# Patient Record
Sex: MALE | Race: BLACK OR AFRICAN AMERICAN | NOT HISPANIC OR LATINO | Employment: UNEMPLOYED | URBAN - METROPOLITAN AREA
[De-identification: names, ages, dates, MRNs, and addresses within clinical notes are randomized per-mention and may not be internally consistent; named-entity substitution may affect disease eponyms.]

---

## 2017-07-31 ENCOUNTER — TELEPHONE (OUTPATIENT)
Dept: PEDIATRICS | Facility: CLINIC | Age: 22
End: 2017-07-31

## 2017-07-31 NOTE — TELEPHONE ENCOUNTER
Spoke with patient's mom. Told her that I received a fax from [employer] to have a 90 L form completed. Told mom I will have to schedule him an appointment b/c he hasn't been seen in over a year. Scheduled an appointment for Monday 8/14/17 at 4:20. Mom verbalized understanding and asked that I mail a reminder. Verified address and told her I will put in mail.

## 2017-09-11 ENCOUNTER — TELEPHONE (OUTPATIENT)
Dept: PEDIATRICS | Facility: CLINIC | Age: 22
End: 2017-09-11

## 2017-09-11 NOTE — TELEPHONE ENCOUNTER
Spoke with patient's mom. She wanted to reschedule his appointment. Scheduled it for tomorrow 9/12/17 at 9 am.

## 2017-09-11 NOTE — TELEPHONE ENCOUNTER
----- Message from Erin Herrera sent at 9/11/2017  9:42 AM CDT -----  Contact: Mrs Kelly/mother  States she's returning Latisha's call. Please call Mrs Kelly at [phone]. Thank you

## 2017-09-11 NOTE — TELEPHONE ENCOUNTER
----- Message from Amrit Rajput sent at 9/11/2017  8:48 AM CDT -----  Contact: [phone]   Would like to consult with nurse about fit in for pt on 9/12 or 9/13 around 8:30 on either day. 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  would like to bring him then. Please call back at [phone]. Thx. lj

## 2017-09-12 ENCOUNTER — OFFICE VISIT (OUTPATIENT)
Dept: PEDIATRICS | Facility: CLINIC | Age: 22
End: 2017-09-12
Payer: COMMERCIAL

## 2017-09-12 VITALS
HEIGHT: 68 IN | WEIGHT: 166.25 LBS | DIASTOLIC BLOOD PRESSURE: 72 MMHG | SYSTOLIC BLOOD PRESSURE: 118 MMHG | TEMPERATURE: 96 F | BODY MASS INDEX: 25.2 KG/M2

## 2017-09-12 DIAGNOSIS — G47.33 OBSTRUCTIVE SLEEP APNEA: ICD-10-CM

## 2017-09-12 DIAGNOSIS — F84.0 AUTISM: ICD-10-CM

## 2017-09-12 DIAGNOSIS — Z00.00 WELL ADULT EXAM: Primary | ICD-10-CM

## 2017-09-12 PROCEDURE — 99395 PREV VISIT EST AGE 18-39: CPT | Mod: S$GLB,,, | Performed by: PEDIATRICS

## 2017-09-12 PROCEDURE — 99999 PR PBB SHADOW E&M-EST. PATIENT-LVL III: CPT | Mod: PBBFAC,,, | Performed by: PEDIATRICS

## 2017-09-18 ENCOUNTER — TELEPHONE (OUTPATIENT)
Dept: PEDIATRICS | Facility: CLINIC | Age: 22
End: 2017-09-18

## 2017-09-18 NOTE — TELEPHONE ENCOUNTER
Called and spoke with mom. Mom verbalized she misplaced the copy of the 90L form. Mom verbalized she will pick it up. Forms placed at the . Mom verbalized understanding.

## 2017-09-18 NOTE — TELEPHONE ENCOUNTER
----- Message from Yvan Roca sent at 9/18/2017  2:10 PM CDT -----  Contact: pt Mother mEely  Pt is calling Nurse staff call regarding a copy of pt 90L form. Pt has misplaced the other copy. Pt call back [phone] to be advised. thanks

## 2017-09-20 ENCOUNTER — TELEPHONE (OUTPATIENT)
Dept: PEDIATRICS | Facility: CLINIC | Age: 22
End: 2017-09-20

## 2017-09-20 DIAGNOSIS — J01.90 ACUTE BACTERIAL SINUSITIS: Primary | ICD-10-CM

## 2017-09-20 DIAGNOSIS — B96.89 ACUTE BACTERIAL SINUSITIS: Primary | ICD-10-CM

## 2017-09-20 NOTE — TELEPHONE ENCOUNTER
Spoke with patient's mom. She said that Rome has a bad cough and would like to know if anything can be given? He was recently seen for his sinus issues.

## 2017-09-20 NOTE — TELEPHONE ENCOUNTER
----- Message from Lola Wilson sent at 9/20/2017  1:56 PM CDT -----  Contact: pt mom Mrs. Kelly  States patient is still having trouble with his sinus. Wants to know if something can be called in.    Pt use..  [facility] [place] - [address] [facility]  [address]  [place] [address]  Phone: [phone] Fax: [phone]    Please adv/call [phone].//thanks. cw

## 2017-09-21 RX ORDER — CEFDINIR 300 MG/1
300 CAPSULE ORAL 2 TIMES DAILY
Qty: 20 CAPSULE | Refills: 0 | Status: SHIPPED | OUTPATIENT
Start: 2017-09-21 | End: 2017-10-01

## 2017-09-21 NOTE — TELEPHONE ENCOUNTER
----- Message from Marta Pandya sent at 9/21/2017  3:11 PM CDT -----  Contact: mom   Mom returning nurse Latisha call, please call mom @ [phone].

## 2017-10-01 NOTE — PROGRESS NOTES
Subjective:      Rome Kelly is a 21 y.o. male here with mother. Patient brought in for Well Child (90L)      History of Present Illness:  This 21 year old with autism and FREDERICK is here for a check up.  He is in self contained autism class at [facility].  He has no current complaints.  No medications, no recent hospitalizations. He continues to use his CPAP at night.        Review of Systems   Constitutional: Negative for fever and unexpected weight change.   HENT: Positive for congestion and rhinorrhea.    Eyes: Negative for discharge and redness.   Respiratory: Positive for cough. Negative for wheezing.    Gastrointestinal: Negative for constipation, diarrhea and vomiting.   Genitourinary: Negative for decreased urine volume and difficulty urinating.   Musculoskeletal: Negative for arthralgias and joint swelling.   Skin: Negative for rash and wound.   Neurological: Negative for syncope and headaches.   Psychiatric/Behavioral: Negative for behavioral problems and sleep disturbance.       Objective:     Physical Exam   Constitutional: He is oriented to person, place, and time. He appears well-developed and well-nourished. No distress.   HENT:   Right Ear: External ear normal.   Left Ear: External ear normal.   Mouth/Throat: Oropharynx is clear and moist.   TMs clear bilaterally   Eyes: Conjunctivae are normal. Pupils are equal, round, and reactive to light.   Cardiovascular: Normal rate, regular rhythm and normal heart sounds.    No murmur heard.  Pulmonary/Chest: Effort normal and breath sounds normal.   Abdominal: Soft. Bowel sounds are normal. He exhibits no mass. There is no tenderness.   Musculoskeletal: He exhibits no edema.   Lymphadenopathy:     He has no cervical adenopathy.   Neurological: He is alert and oriented to person, place, and time.   Skin: Skin is warm. No rash noted.   Psychiatric: He has a normal mood and affect. His behavior is normal.       Assessment:        1. Well adult exam    2.  Autism    3. Obstructive sleep apnea         Plan:         Problem List Items Addressed This Visit     Autism    Obstructive sleep apnea      Other Visit Diagnoses     Well adult exam    -  Primary        90L form completed (will be scanned)  Age appropriate anticipatory guidance  All vaccine components discussed  Call with any concerns

## 2017-10-13 ENCOUNTER — TELEPHONE (OUTPATIENT)
Dept: PEDIATRICS | Facility: CLINIC | Age: 22
End: 2017-10-13

## 2017-10-13 NOTE — TELEPHONE ENCOUNTER
LMOM requesting return call. Pt needs to see dr who originally gave him rx for CPAP. Dr Ramos does not order CPAP.

## 2017-10-13 NOTE — TELEPHONE ENCOUNTER
----- Message from Nathalie Aiken sent at 10/12/2017  4:26 PM CDT -----  Contact: Jihan Kelly (Mother)  Jihan called and stated she needed to speak to the nurse. She stated that the pt needs a CPAP machine. She can be reached at [phone].    Thanks,  TF

## 2017-11-07 ENCOUNTER — TELEPHONE (OUTPATIENT)
Dept: PEDIATRICS | Facility: CLINIC | Age: 22
End: 2017-11-07

## 2017-11-07 NOTE — TELEPHONE ENCOUNTER
----- Message from Marta Pandya sent at 11/6/2017 10:59 AM CST -----  Contact: mom  Please call pt mom @ [phone], regarding an order for pt to get lab tomorrow.

## 2017-11-14 ENCOUNTER — TELEPHONE (OUTPATIENT)
Dept: PEDIATRICS | Facility: CLINIC | Age: 22
End: 2017-11-14

## 2017-11-14 NOTE — TELEPHONE ENCOUNTER
----- Message from Ania Hinton sent at 11/13/2017  4:55 PM CST -----  Contact: Mrs. Kelly/mom  Mrs. Kelly called to speak with the nurse; she wants to know if there is any type of test that can be done to check on him mentally. Because when she gives instructions he does the opposite. She can be contacted at [phone].    Thanks,  Aina

## 2017-11-15 ENCOUNTER — TELEPHONE (OUTPATIENT)
Dept: PEDIATRICS | Facility: CLINIC | Age: 22
End: 2017-11-15

## 2017-11-15 NOTE — TELEPHONE ENCOUNTER
----- Message from Lola Wilson sent at 11/15/2017  3:20 PM CST -----  Contact: Pasquale Giordano returning Jennie call. Please adv/call [phone].//thanks. cw

## 2017-11-15 NOTE — TELEPHONE ENCOUNTER
----- Message from Adelina [place] sent at 11/15/2017  9:20 AM CST -----  Contact: mother Emely  Calling regarding information about patient's surgery. Mother is giving permission to father to be called regarding patient. Please call father Pasquale today ASAP @ [phone]. Thanks, mann

## 2017-11-15 NOTE — TELEPHONE ENCOUNTER
Father wanted to know if we received fax from Dr Herrera, dentist. Pt is nadir'd for dental surgery on Dec 1. Informed him I would look and see if we received fax and call him back. Fax listing lab and asking if pt had recently received any of the lab test. Replied informing them he had not. Called father, dilip.

## 2017-11-16 ENCOUNTER — TELEPHONE (OUTPATIENT)
Dept: PEDIATRICS | Facility: CLINIC | Age: 22
End: 2017-11-16

## 2017-11-16 DIAGNOSIS — F84.0 AUTISM: Primary | ICD-10-CM

## 2017-11-16 DIAGNOSIS — R46.89 DEFIANT BEHAVIOR: ICD-10-CM

## 2017-11-16 NOTE — TELEPHONE ENCOUNTER
I, also, have not seen the form.      Let mother know that the patient's behavior is typical of a teenager, especially a teenager with autism.  Let me know if she thinks it's time for a repeat psychological evaluation.

## 2017-11-16 NOTE — TELEPHONE ENCOUNTER
----- Message from Trisha Schreiber LPN sent at 11/16/2017 10:33 AM CST -----  Mother informed of Dr Ramos's reply regarding his behavior. Mother states she would like for Rome to start see psychologist, Chad Almonte, that already sees Carlo. He works at [facility].  Informed her I would let Dr Ramos know so she can put in referral.

## 2017-11-17 ENCOUNTER — TELEPHONE (OUTPATIENT)
Dept: INTERNAL MEDICINE | Facility: CLINIC | Age: 22
End: 2017-11-17

## 2017-11-17 NOTE — TELEPHONE ENCOUNTER
Psychology referral faxed w/ [employer]graphics and snapshot to [facility] P#[phone] F#[phone], w/ instructions for their office to call pt's parent(s) and sched pt for appt w/ appropriate provider, fax transmission confirmed.

## 2017-11-27 ENCOUNTER — TELEPHONE (OUTPATIENT)
Dept: PEDIATRICS | Facility: CLINIC | Age: 22
End: 2017-11-27

## 2017-11-27 NOTE — TELEPHONE ENCOUNTER
Spoke with Marimar with Dr. Herrera. She stated that they were not able to read fax. Requested that it be faxed again.

## 2018-01-04 ENCOUNTER — TELEPHONE (OUTPATIENT)
Dept: PEDIATRICS | Facility: CLINIC | Age: 23
End: 2018-01-04

## 2018-01-04 NOTE — TELEPHONE ENCOUNTER
----- Message from Maria E Cummings sent at 1/4/2018  3:23 PM CST -----  Contact: Pt-mom    Pt mom wanted to know when was the last time pt was checked for chloesterol and diabetes, mom also wanted to know if pt can be seen Monday callback number..[phone] (home)

## 2018-01-05 NOTE — TELEPHONE ENCOUNTER
----- Message from Pat Portillo sent at 1/5/2018 12:49 PM CST -----  Pt mom(Lisas)// At [phone]//states is calling regarding an appt//Dr Ramos wants pt to have a checkup every 2 yrs//to be checked for diabetics and cholesterol/// it is time to have done now//his brother has an appt on Monday//1-8-18 at 10:40am//with Dr Ramos//is wanting to know if possible to have appt for pt also//please call//thanks/lh

## 2018-01-08 ENCOUNTER — OFFICE VISIT (OUTPATIENT)
Dept: PEDIATRICS | Facility: CLINIC | Age: 23
End: 2018-01-08
Payer: COMMERCIAL

## 2018-01-08 ENCOUNTER — LAB VISIT (OUTPATIENT)
Dept: LAB | Facility: HOSPITAL | Age: 23
End: 2018-01-08
Attending: PEDIATRICS
Payer: COMMERCIAL

## 2018-01-08 VITALS
WEIGHT: 163.13 LBS | BODY MASS INDEX: 24.16 KG/M2 | TEMPERATURE: 96 F | HEIGHT: 69 IN | DIASTOLIC BLOOD PRESSURE: 76 MMHG | SYSTOLIC BLOOD PRESSURE: 116 MMHG

## 2018-01-08 DIAGNOSIS — Z00.00 WELL ADULT EXAM: Primary | ICD-10-CM

## 2018-01-08 DIAGNOSIS — G47.33 OBSTRUCTIVE SLEEP APNEA: ICD-10-CM

## 2018-01-08 DIAGNOSIS — Z83.3 FAMILY HISTORY OF DIABETES MELLITUS (DM): ICD-10-CM

## 2018-01-08 DIAGNOSIS — F84.0 AUTISM: ICD-10-CM

## 2018-01-08 DIAGNOSIS — Z00.00 WELL ADULT EXAM: ICD-10-CM

## 2018-01-08 LAB
CHOLEST SERPL-MCNC: 193 MG/DL
CHOLEST/HDLC SERPL: 3.1 {RATIO}
ESTIMATED AVG GLUCOSE: 108 MG/DL
HBA1C MFR BLD HPLC: 5.4 %
HDLC SERPL-MCNC: 63 MG/DL
HDLC SERPL: 32.6 %
HGB BLD-MCNC: 14.5 G/DL
LDLC SERPL CALC-MCNC: 120 MG/DL
NONHDLC SERPL-MCNC: 130 MG/DL
TRIGL SERPL-MCNC: 50 MG/DL
TSH SERPL DL<=0.005 MIU/L-ACNC: 1.44 UIU/ML

## 2018-01-08 PROCEDURE — 90732 PPSV23 VACC 2 YRS+ SUBQ/IM: CPT | Mod: S$GLB,,, | Performed by: PEDIATRICS

## 2018-01-08 PROCEDURE — 99395 PREV VISIT EST AGE 18-39: CPT | Mod: 25,S$GLB,, | Performed by: PEDIATRICS

## 2018-01-08 PROCEDURE — 84443 ASSAY THYROID STIM HORMONE: CPT

## 2018-01-08 PROCEDURE — 99999 PR PBB SHADOW E&M-EST. PATIENT-LVL III: CPT | Mod: PBBFAC,,, | Performed by: PEDIATRICS

## 2018-01-08 PROCEDURE — 80061 LIPID PANEL: CPT

## 2018-01-08 PROCEDURE — 83036 HEMOGLOBIN GLYCOSYLATED A1C: CPT

## 2018-01-08 PROCEDURE — 90471 IMMUNIZATION ADMIN: CPT | Mod: S$GLB,,, | Performed by: PEDIATRICS

## 2018-01-08 PROCEDURE — 36415 COLL VENOUS BLD VENIPUNCTURE: CPT | Mod: PO

## 2018-01-08 PROCEDURE — 85018 HEMOGLOBIN: CPT

## 2018-01-10 ENCOUNTER — TELEPHONE (OUTPATIENT)
Dept: PEDIATRICS | Facility: CLINIC | Age: 23
End: 2018-01-10

## 2018-01-10 NOTE — TELEPHONE ENCOUNTER
----- Message from Nathaly Lalo sent at 1/10/2018  4:35 PM CST -----  Contact: Pt's Mom  She states that the pt is running a slight fever, but she is concerned about his sinuses and wanted to know if antibiotics can be called in for the pt.    She can be reached at .[phone] (home) [phone] (work)    .  [employer] [address] [place] [address] [address] AT [facility] [place] [facility]\"" [place] [address]  [facility] [place] [address]  Phone: [phone] Fax: [phone]

## 2018-01-10 NOTE — TELEPHONE ENCOUNTER
Mother states he feels hot on his arms, ask her did she check temperature. Mother states thermometer is broken. Advised her that she can give him tylenol or motrin but she will need to replace the thermometer. Explained that she can't accurately  a fever by feeling.

## 2018-01-11 ENCOUNTER — TELEPHONE (OUTPATIENT)
Dept: PEDIATRICS | Facility: CLINIC | Age: 23
End: 2018-01-11

## 2018-01-11 NOTE — TELEPHONE ENCOUNTER
----- Message from Lynda Wilson sent at 1/11/2018  2:13 PM CST -----  Contact: mother  States the number to [facility] is [phone] / [phone], no additional info given, can be reached at [phone]///thxMW

## 2018-01-11 NOTE — LETTER
January 11, 2018    Rome Kelly   [address] [address]  [place] [address]             [facility]  [address] [place] [address]  Phone: [phone]  Fax: [phone] Dear Mr. Rome Kelly:    Below are the results from your recent visit on 1/8/2018      Your results are normal.  Please don't hesitate to call our office if you have any questions or concerns.      Sincerely,        Mary Ramos MD/Kalani Donaldson LPN

## 2018-01-11 NOTE — TELEPHONE ENCOUNTER
Mother was calling to give fax number to have 90L form to be faxed. Informed that 90L was already faxed on 1/8/18. Mother verbalized understanding.     Mother would also like a letter stating when pt had labs done and that labs were normal. She would like it mailed.

## 2018-01-22 NOTE — PROGRESS NOTES
Subjective:      Rome Kelly is a 22 y.o. male here with patient and mother. Patient brought in for Well Child      History of Present Illness:  This 22-year-old is here for a well-child exam.  He has autism, obstructive sleep apnea, history of diabetes.  He states that he is doing well.  His mother has no specifics complaints or concerns.  He needs his 90L form filled out.        Review of Systems   Constitutional: Negative for fever and unexpected weight change.   HENT: Negative for congestion and rhinorrhea.    Eyes: Negative for discharge and redness.   Respiratory: Negative for cough and wheezing.    Gastrointestinal: Negative for constipation, diarrhea and vomiting.   Genitourinary: Negative for decreased urine volume and difficulty urinating.   Musculoskeletal: Negative for arthralgias and joint swelling.   Skin: Negative for rash and wound.   Neurological: Negative for syncope and headaches.   Psychiatric/Behavioral: Negative for behavioral problems and sleep disturbance.       Objective:     Physical Exam   Constitutional: He appears well-developed and well-nourished. No distress.   HENT:   Head: Normocephalic and atraumatic.   Right Ear: Tympanic membrane and external ear normal.   Left Ear: Tympanic membrane and external ear normal.   Nose: Nose normal.   Mouth/Throat: Uvula is midline, oropharynx is clear and moist and mucous membranes are normal. Normal dentition.   Eyes: Conjunctivae, EOM and lids are normal. Pupils are equal, round, and reactive to light.   Neck: Trachea normal and normal range of motion. Neck supple. No thyromegaly present.   Cardiovascular: Normal rate, regular rhythm, S1 normal, S2 normal, normal heart sounds and normal pulses.  Exam reveals no gallop and no friction rub.    No murmur heard.  Pulmonary/Chest: Effort normal and breath sounds normal. He has no wheezes. He has no rales.   Abdominal: Soft. Normal appearance and bowel sounds are normal. He exhibits no mass.  There is no hepatosplenomegaly. There is no tenderness. There is no rebound and no guarding.   Musculoskeletal: Normal range of motion.   No scoliosis.   Lymphadenopathy:     He has no cervical adenopathy.   Neurological: He is alert. He has normal strength. Coordination and gait normal.   Skin: Skin is warm and intact. No rash noted.   Psychiatric: He has a normal mood and affect. His speech is normal and behavior is normal.       Assessment:        1. Well adult exam    2. Obstructive sleep apnea    3. Autism    4. Family history of diabetes mellitus (DM)         Plan:         Problem List Items Addressed This Visit     Autism    Obstructive sleep apnea      Other Visit Diagnoses     Well adult exam    -  Primary    Relevant Orders    TSH (Completed)    Hemoglobin (Completed)    (In Office Administered) Pneumococcal Polysaccharide Vaccine (23 Valent) (SQ/IM) (Completed)    Family history of diabetes mellitus (DM)        Relevant Orders    Hemoglobin A1c (Completed)    Lipid panel (Completed)      90L completed and faxed  Age appropriate anticipatory guidance  All vaccine components discussed  Call with any concerns

## 2018-02-09 ENCOUNTER — TELEPHONE (OUTPATIENT)
Dept: PEDIATRICS | Facility: CLINIC | Age: 23
End: 2018-02-09

## 2018-02-09 NOTE — TELEPHONE ENCOUNTER
----- Message from Staci Joseph sent at 2/9/2018  1:24 PM CST -----  Contact: Rashaun-[employer]   He's returning a call, please advise [phone] or [phone] ext [phone]

## 2018-02-09 NOTE — TELEPHONE ENCOUNTER
S/w Rashaun, states he wanted to let you know that he actually doesn't have anything to do with getting a c pap machine for the pt. States that is between the family, dr, and ins. Informed him I will let Dr Ramos know.

## 2018-04-11 ENCOUNTER — TELEPHONE (OUTPATIENT)
Dept: PEDIATRICS | Facility: CLINIC | Age: 23
End: 2018-04-11

## 2018-04-11 NOTE — TELEPHONE ENCOUNTER
----- Message from Carolina Skelton sent at 4/11/2018  8:24 AM CDT -----  Contact: Patients mother, Ursula Burgos needs a statement that her son is Autistic, and patient has been a patient since Cint[facility] in 2005, letter needs to state that he is autistic, please call her back at [phone]. thank you

## 2018-05-01 ENCOUNTER — TELEPHONE (OUTPATIENT)
Dept: PEDIATRICS | Facility: CLINIC | Age: 23
End: 2018-05-01

## 2018-05-01 NOTE — TELEPHONE ENCOUNTER
Mother wants to know when is the earliest her sons can be checked for prostate cancer. A friend of her's passed yesterday from it and it has made her worry and she wants to keep on top of things. Informed her I will have to ask Dr Ramos and call her back.

## 2018-05-01 NOTE — TELEPHONE ENCOUNTER
I don't do that kind of exam.  It is a digital rectal exam.  A urologist can do that. however, generally this is not done tell people are at least 50 years old.

## 2018-05-01 NOTE — TELEPHONE ENCOUNTER
----- Message from Eveline Cuevas sent at 5/1/2018 12:15 PM CDT -----  Contact: pt mom  Calling with questions  And please advise [phone] (home) [phone] (work)

## 2018-05-21 ENCOUNTER — TELEPHONE (OUTPATIENT)
Dept: PEDIATRICS | Facility: CLINIC | Age: 23
End: 2018-05-21

## 2018-05-21 NOTE — TELEPHONE ENCOUNTER
Mother states she and her mother have appts on 05/25/18 at 10am, requesting sons to be seen either before or after her appt. Tang'd for 11:20 am, advised mother to come down after her appt and check them in. Mother agrees

## 2018-05-21 NOTE — TELEPHONE ENCOUNTER
----- Message from Lynda Wilson sent at 5/21/2018 10:00 AM CDT -----  Contact: Mother  States the pt has a cough and wants to be worked in on 5/25,  can be reached at [phone]///thxMW

## 2018-05-25 ENCOUNTER — OFFICE VISIT (OUTPATIENT)
Dept: PEDIATRICS | Facility: CLINIC | Age: 23
End: 2018-05-25
Payer: COMMERCIAL

## 2018-05-25 VITALS — BODY MASS INDEX: 26.2 KG/M2 | TEMPERATURE: 97 F | WEIGHT: 174.81 LBS

## 2018-05-25 DIAGNOSIS — J20.8 ACUTE BACTERIAL BRONCHITIS: Primary | ICD-10-CM

## 2018-05-25 DIAGNOSIS — B96.89 ACUTE BACTERIAL BRONCHITIS: Primary | ICD-10-CM

## 2018-05-25 PROCEDURE — 3008F BODY MASS INDEX DOCD: CPT | Mod: CPTII,S$GLB,, | Performed by: PEDIATRICS

## 2018-05-25 PROCEDURE — 99213 OFFICE O/P EST LOW 20 MIN: CPT | Mod: S$GLB,,, | Performed by: PEDIATRICS

## 2018-05-25 PROCEDURE — 99999 PR PBB SHADOW E&M-EST. PATIENT-LVL II: CPT | Mod: PBBFAC,,, | Performed by: PEDIATRICS

## 2018-05-25 RX ORDER — AZITHROMYCIN 250 MG/1
TABLET, FILM COATED ORAL
Qty: 6 TABLET | Refills: 0 | Status: SHIPPED | OUTPATIENT
Start: 2018-05-25 | End: 2018-05-30

## 2018-06-04 NOTE — PROGRESS NOTES
Subjective:      Rome Kelly is a 22 y.o. male here with patient and mother. Patient brought in for Cough and Nasal Congestion      History of Present Illness:  Cough   This is a new problem. The current episode started 1 to 4 weeks ago. The problem has been gradually worsening. The problem occurs hourly. The cough is productive of sputum. Associated symptoms include nasal congestion and rhinorrhea. Pertinent negatives include no fever, headaches, rash, shortness of breath or wheezing. The symptoms are aggravated by lying down. He has tried nothing for the symptoms.       Review of Systems   Constitutional: Negative for activity change, appetite change and fever.   HENT: Positive for congestion and rhinorrhea.    Eyes: Negative for discharge.   Respiratory: Positive for cough. Negative for shortness of breath and wheezing.    Gastrointestinal: Negative for diarrhea and vomiting.   Genitourinary: Negative for decreased urine volume.   Skin: Negative for rash.   Neurological: Negative for headaches.       Objective:     Physical Exam   Constitutional: He is oriented to person, place, and time. He appears well-developed and well-nourished. No distress.   HENT:   Right Ear: External ear normal.   Left Ear: External ear normal.   Mouth/Throat: Oropharynx is clear and moist.   TMs clear bilaterally.   Eyes: Conjunctivae are normal. Pupils are equal, round, and reactive to light.   Cardiovascular: Normal rate, regular rhythm and normal heart sounds.    No murmur heard.  Pulmonary/Chest: Effort normal. He has rales (loose, bilateral, cleared with cough).   Abdominal: Soft. Bowel sounds are normal. He exhibits no mass. There is no tenderness.   Musculoskeletal: He exhibits no edema.   Lymphadenopathy:     He has no cervical adenopathy.   Neurological: He is alert and oriented to person, place, and time.   Skin: Skin is warm. No rash noted.   Psychiatric: He has a normal mood and affect. His behavior is normal.        Assessment:        1. Acute bacterial bronchitis         Plan:         Problem List Items Addressed This Visit     None      Visit Diagnoses     Acute bacterial bronchitis    -  Primary        Azithromycin x 5 days  Symptomatic measures  Call with any new or worsening problems  Follow up as needed

## 2018-08-30 ENCOUNTER — TELEPHONE (OUTPATIENT)
Dept: PEDIATRICS | Facility: CLINIC | Age: 23
End: 2018-08-30

## 2018-08-30 NOTE — TELEPHONE ENCOUNTER
----- Message from Pauline Gonzalez sent at 8/30/2018  8:50 AM CDT -----  Contact: mother  FYI; 90L form will be sent to dr. Ramos..[phone]

## 2018-08-31 NOTE — TELEPHONE ENCOUNTER
Completed form faxed back to [phone]. Notified mother that completed 90L was faxed. Mother verbalized understanding and requested that a copy of pt and siblings 90L be mailed to her PO box. Pt's 90L mailed, waiting for siblings to be scanned into the computer.

## 2018-09-07 ENCOUNTER — TELEPHONE (OUTPATIENT)
Dept: PEDIATRICS | Facility: CLINIC | Age: 23
End: 2018-09-07

## 2018-09-07 NOTE — TELEPHONE ENCOUNTER
Spoke with patient's mom. She was calling about her other child. I will leave message under the correct child.

## 2018-09-07 NOTE — TELEPHONE ENCOUNTER
----- Message from Adelina [place] sent at 9/7/2018  4:25 PM CDT -----  Contact: mother Mrs Kelly  Calling concerning a letter sent on patient. Please call mother @ [phone]. Thanks, mann

## 2018-10-25 ENCOUNTER — TELEPHONE (OUTPATIENT)
Dept: PEDIATRICS | Facility: CLINIC | Age: 23
End: 2018-10-25

## 2018-10-25 NOTE — TELEPHONE ENCOUNTER
----- Message from Lynda Wilson sent at 10/25/2018 11:02 AM CDT -----  Contact: Mother  States a letter is need for voters registration stating the pt is autistic and  is a pt of Dr. Ramos, the pt mother can be reached at [phone]///thxMW

## 2018-11-16 ENCOUNTER — TELEPHONE (OUTPATIENT)
Dept: PEDIATRICS | Facility: CLINIC | Age: 23
End: 2018-11-16

## 2018-11-16 NOTE — TELEPHONE ENCOUNTER
Spoke with patient's mom. She wanted to know when he was due for the next well visit and blood work. Told her in Jan 2019. Mom stated ok. She will call back and schedule the appointment.

## 2018-11-16 NOTE — TELEPHONE ENCOUNTER
----- Message from Chilango Arcos sent at 11/16/2018  4:35 PM CST -----  Contact: Mother [phone]  Would like to consult with nurse regarding scheduling labs. Please call back at [phone].  Md Cassie

## 2018-12-19 ENCOUNTER — TELEPHONE (OUTPATIENT)
Dept: PEDIATRICS | Facility: CLINIC | Age: 23
End: 2018-12-19

## 2018-12-19 NOTE — TELEPHONE ENCOUNTER
Spoke with patient's mom. She would like to drop off forms to be filled out for Rome's transportation. Told her she can bring them by tomorrow and I will give them to Dr Ramos. She asked that once completed if we can fax to the # on form. Told her that will be fine.

## 2018-12-19 NOTE — TELEPHONE ENCOUNTER
----- Message from Victorina Rodriguez sent at 12/19/2018 12:45 PM CST -----  Contact: Jihan- mother  Pt has some paperwork that needs to be filled put by Dr. Ramos for school/ transportation. Pt's mom would like to know if paperwork and be dropped of and picked up by tomorrow, 12/20. Please call Jihan back at [phone].      Thanks,   Victorina Rodriguez

## 2018-12-21 ENCOUNTER — TELEPHONE (OUTPATIENT)
Dept: PEDIATRICS | Facility: CLINIC | Age: 23
End: 2018-12-21

## 2018-12-21 NOTE — TELEPHONE ENCOUNTER
Emailed forms to [email or web address]. Called mother, no answer. Left message informing that forms were emailed.

## 2018-12-21 NOTE — TELEPHONE ENCOUNTER
----- Message from Marta Pandya sent at 12/21/2018  2:17 PM CST -----  Contact: mom  Please call pt mom @ [phone] regarding papers she drop off, need to know if papers were Emailed to Mr. Roa.

## 2019-02-28 ENCOUNTER — TELEPHONE (OUTPATIENT)
Dept: PEDIATRICS | Facility: CLINIC | Age: 24
End: 2019-02-28

## 2019-02-28 NOTE — TELEPHONE ENCOUNTER
She would like to know if it is time for you to check him for Diabetes and Cholesterol. She said that you told her that it is to be checked on a regular basis and she wants to know if it is time? Told her I will check and call back. She would a call back on her home # [phone] and it is ok to leave a message if she doesn't answer.

## 2019-03-01 ENCOUNTER — TELEPHONE (OUTPATIENT)
Dept: PEDIATRICS | Facility: CLINIC | Age: 24
End: 2019-03-01

## 2019-03-01 NOTE — TELEPHONE ENCOUNTER
----- Message from Inna Harvey sent at 3/1/2019 11:05 AM CST -----  Contact: mother  Mother requesting orders for lab work to check pt's cholesterol and A1C.Please schedule for 3/4/2019.please call back at [phone].A message can be left.      Thanks,  Inna Harvey

## 2019-03-01 NOTE — TELEPHONE ENCOUNTER
Notified mother that pt's last labs were normal and he doesn't need new labs. Mother verbalized understanding.

## 2019-03-01 NOTE — TELEPHONE ENCOUNTER
Called number provided, no answer. Per last telephone call this morning, Ginny advised mother that pt does not need labs done.

## 2019-03-01 NOTE — TELEPHONE ENCOUNTER
Spoke with patient's mom. Told there that he is not due for any labs at this time. She verbalized understanding.

## 2019-09-09 ENCOUNTER — TELEPHONE (OUTPATIENT)
Dept: PEDIATRICS | Facility: CLINIC | Age: 24
End: 2019-09-09

## 2019-09-09 NOTE — TELEPHONE ENCOUNTER
Attempted to contact nathaniel with [facility], she was not available at the time. I was routed to the [facility], left message to call the office back . Spoke with the parent and notified her that the patient would need to be seen. Appt was scheduled for 9/17/2019 at 10:20 with dr Ramos. Mom verbalized understanding

## 2019-09-09 NOTE — TELEPHONE ENCOUNTER
----- Message from Nathaly Malloy sent at 9/9/2019  2:42 PM CDT -----  Contact: Inna/ FREDIS Keith needs to get a 90L form completed and sent back to [phone](attn: Jeanette) and or call her at [phone].    Thanks  Td

## 2019-09-11 NOTE — TELEPHONE ENCOUNTER
Pt's 90L states that it needs to be completed by 9/13/19 but pt's appt isn't scheduled until 9/24/19. Called Marcelina Marquez with [facility], no answer. Left message informing of pt's appt date and requested that she call clinic back to let us know if appt on 9/24/19 is okay or if pt needs to be seen sooner.

## 2019-09-24 ENCOUNTER — OFFICE VISIT (OUTPATIENT)
Dept: PEDIATRICS | Facility: CLINIC | Age: 24
End: 2019-09-24
Payer: COMMERCIAL

## 2019-09-24 VITALS
WEIGHT: 171.31 LBS | HEIGHT: 69 IN | BODY MASS INDEX: 25.37 KG/M2 | TEMPERATURE: 98 F | DIASTOLIC BLOOD PRESSURE: 62 MMHG | SYSTOLIC BLOOD PRESSURE: 112 MMHG

## 2019-09-24 DIAGNOSIS — Z13.1 SCREENING FOR DIABETES MELLITUS: ICD-10-CM

## 2019-09-24 DIAGNOSIS — Z00.00 WELL ADULT EXAM: Primary | ICD-10-CM

## 2019-09-24 DIAGNOSIS — F84.0 AUTISM: ICD-10-CM

## 2019-09-24 DIAGNOSIS — Z23 NEED FOR VACCINATION: ICD-10-CM

## 2019-09-24 LAB — GLUCOSE SERPL-MCNC: 86 MG/DL (ref 70–110)

## 2019-09-24 PROCEDURE — 90620 MENB-4C VACCINE IM: CPT | Mod: S$GLB,,, | Performed by: PEDIATRICS

## 2019-09-24 PROCEDURE — 82962 GLUCOSE BLOOD TEST: CPT | Mod: S$GLB,,, | Performed by: PEDIATRICS

## 2019-09-24 PROCEDURE — 82962 POCT GLUCOSE, HAND-HELD DEVICE: ICD-10-PCS | Mod: S$GLB,,, | Performed by: PEDIATRICS

## 2019-09-24 PROCEDURE — 90471 MENINGOCOCCAL B, OMV VACCINE: ICD-10-PCS | Mod: S$GLB,,, | Performed by: PEDIATRICS

## 2019-09-24 PROCEDURE — 90620 MENINGOCOCCAL B, OMV VACCINE: ICD-10-PCS | Mod: S$GLB,,, | Performed by: PEDIATRICS

## 2019-09-24 PROCEDURE — 90471 IMMUNIZATION ADMIN: CPT | Mod: S$GLB,,, | Performed by: PEDIATRICS

## 2019-09-24 PROCEDURE — 99395 PREV VISIT EST AGE 18-39: CPT | Mod: 25,S$GLB,, | Performed by: PEDIATRICS

## 2019-09-24 PROCEDURE — 99999 PR PBB SHADOW E&M-EST. PATIENT-LVL III: CPT | Mod: PBBFAC,,, | Performed by: PEDIATRICS

## 2019-09-24 PROCEDURE — 99395 PR PREVENTIVE VISIT,EST,18-39: ICD-10-PCS | Mod: 25,S$GLB,, | Performed by: PEDIATRICS

## 2019-09-24 PROCEDURE — 99999 PR PBB SHADOW E&M-EST. PATIENT-LVL III: ICD-10-PCS | Mod: PBBFAC,,, | Performed by: PEDIATRICS

## 2019-09-24 NOTE — PROGRESS NOTES
Subjective:      Rome Kelly is a 23 y.o. male here with patient and mother. Patient brought in for Well Child      History of Present Illness:  This 23-year-old with autism is here for a checkup.  He has been well lately.  He denies any current illness.  His mother is concerned about diabetes because there is a family history of diabetes.  He is currently enrolled in some culinary classes at [employer].  He attends these classes without an aid and does well.      Review of Systems   Constitutional: Negative for fever and unexpected weight change.   HENT: Negative for congestion and rhinorrhea.    Eyes: Negative for discharge and redness.   Respiratory: Negative for cough and wheezing.    Gastrointestinal: Negative for constipation, diarrhea and vomiting.   Genitourinary: Negative for decreased urine volume and difficulty urinating.   Musculoskeletal: Negative for arthralgias and joint swelling.   Skin: Negative for rash and wound.   Neurological: Negative for syncope and headaches.   Psychiatric/Behavioral: Negative for behavioral problems and sleep disturbance.       Objective:     Physical Exam   Constitutional: He appears well-developed and well-nourished. No distress.   HENT:   Head: Normocephalic and atraumatic.   Right Ear: Tympanic membrane and external ear normal.   Left Ear: Tympanic membrane and external ear normal.   Nose: Nose normal.   Mouth/Throat: Uvula is midline, oropharynx is clear and moist and mucous membranes are normal. Normal dentition.   Eyes: Pupils are equal, round, and reactive to light. Conjunctivae, EOM and lids are normal.   Neck: Trachea normal and normal range of motion. Neck supple. No thyromegaly present.   Cardiovascular: Normal rate, regular rhythm, S1 normal, S2 normal, normal heart sounds and normal pulses. Exam reveals no gallop and no friction rub.   No murmur heard.  Pulmonary/Chest: Effort normal and breath sounds normal. He has no wheezes. He  has no rales.   Abdominal: Soft. Normal appearance and bowel sounds are normal. He exhibits no mass. There is no hepatosplenomegaly. There is no tenderness. There is no rebound and no guarding.   Musculoskeletal: Normal range of motion.   No scoliosis.   Lymphadenopathy:     He has no cervical adenopathy.   Neurological: He is alert. He has normal strength. Coordination and gait normal.   Skin: Skin is warm and intact. No rash noted.   Psychiatric: He has a normal mood and affect. His speech is normal and behavior is normal.     FSBG (fasting ) = 86    Assessment:        1. Well adult exam    2. Screening for diabetes mellitus    3. Need for vaccination    4. Autism         Plan:     Problem List Items Addressed This Visit     Autism      Other Visit Diagnoses     Well adult exam    -  Primary    Screening for diabetes mellitus        Relevant Orders    POCT Glucose, Hand-Held Device (Completed)    Need for vaccination        Relevant Orders    (In Office Administered) Meningococcal B, OMV Vaccine (BEXSERO) (Completed)          90L form completed    Age appropriate anticipatory guidance  All vaccine components discussed  Call with any concerns

## 2019-10-15 ENCOUNTER — TELEPHONE (OUTPATIENT)
Dept: PEDIATRICS | Facility: CLINIC | Age: 24
End: 2019-10-15

## 2019-10-15 NOTE — TELEPHONE ENCOUNTER
Attempted to contact the patient, No answer. Left voicemail to call the office back. The excuses are at the   ----- Message from Yvan Roca sent at 10/15/2019 11:24 AM CDT -----  Contact: Pt Mother// Jeramy   Pt Mother is calling the staff regarding a request for an excuse slip showing that the patient did have an appt on Sept 24th .for Rome and for Carlo Kelly [id number].    Pt is  Asking that the slips be left at the  and the pt Grandmother will pick the slips up Avelina Fuller.    Pt call back to be advised [phone]    Thanks

## 2019-10-16 ENCOUNTER — TELEPHONE (OUTPATIENT)
Dept: PEDIATRICS | Facility: CLINIC | Age: 24
End: 2019-10-16

## 2019-10-16 NOTE — TELEPHONE ENCOUNTER
Voicemail left telling the parent that the slips have been left at the  ready for  ----- Message from Candace Avelar sent at 10/16/2019  3:45 PM CDT -----  Contact: pt mom   Mom request return slips left at from desk for  by (pts grand mother ) ... Call back: [phone]

## 2019-10-22 ENCOUNTER — TELEPHONE (OUTPATIENT)
Dept: PEDIATRICS | Facility: CLINIC | Age: 24
End: 2019-10-22

## 2019-10-22 NOTE — TELEPHONE ENCOUNTER
----- Message from Dayanna Perez sent at 10/22/2019  1:15 PM CDT -----  Contact: Emely/nuvia [phone]  Type:  Needs Medical Advice    Who Called: Emely/mom   Symptoms (please be specific): cough, congestion, stopped up nose   How long has patient had these symptoms:  Last week  Pharmacy name and phone #:      [id number] - [place], [place] - [address] AT [facility]  [facility] [address]  [place] [address]  Phone: [phone] Fax: [phone]      Would the patient rather a call back or a response via [facility]? Call back   Best Call Back Number: [phone]  Additional Information:

## 2019-10-22 NOTE — TELEPHONE ENCOUNTER
Spoke with mom and notified her per Dr Andrews that she can try giving the patient OTC cough and cold medications such as triaminic or robitussin. Mom verbalized understanding

## 2019-11-19 ENCOUNTER — TELEPHONE (OUTPATIENT)
Dept: PEDIATRICS | Facility: CLINIC | Age: 24
End: 2019-11-19

## 2019-11-19 NOTE — TELEPHONE ENCOUNTER
Notified Dr. Ramos stated patient's mom needs to call Office of Development Disabilities for IQ recommendation. No other concerns voiced. Mom verbalized understanding.   ----- Message from Candace Avelar sent at 11/19/2019 12:02 PM CST -----  Contact: pt mom   Mom request call back requesting testing for IQ recommendation ... Call back: [phone]

## 2020-01-02 ENCOUNTER — TELEPHONE (OUTPATIENT)
Dept: PEDIATRICS | Facility: CLINIC | Age: 25
End: 2020-01-02

## 2020-01-02 DIAGNOSIS — Z00.00 ROUTINE MEDICAL EXAM: Primary | ICD-10-CM

## 2020-01-02 NOTE — TELEPHONE ENCOUNTER
Mother states that it has been such a long time since the patient has had lab work completed and she has concerns  Of increased appetite. Mother would like to have the labs completed on 1/7/20 due to being here for another appt  ----- Message from Adelina Eastman sent at 1/2/2020 12:52 PM CST -----  Contact: mother Loretta  Calling concerning getting A1C, Chemistry and Cholesterol checked lab work. Would like to have it done 01/07/2020. Please call mother @ [phone]. Thanks, mann

## 2020-01-02 NOTE — TELEPHONE ENCOUNTER
Spoke with mother and scheduled the labs for the morning of 1/7/20 as requested per the mother. She states that she will bring the patient at 8am due to the fact that she has appt here at [facility] [place] [facility] for 9am. Advised mother that Will be fine

## 2020-01-06 ENCOUNTER — TELEPHONE (OUTPATIENT)
Dept: PEDIATRICS | Facility: CLINIC | Age: 25
End: 2020-01-06

## 2020-01-06 NOTE — TELEPHONE ENCOUNTER
S/w mother and informed that lipid panel tests that is scheduled for 1/7/2020 includes cholesterol. Mother verbalized understanding.

## 2020-01-06 NOTE — TELEPHONE ENCOUNTER
----- Message from Georgina Carballo sent at 1/6/2020  3:10 PM CST -----  ..Type:  Patient Returning Call    Who Called: pt   Who Left Message for Patient:  Does the patient know what this is regarding?: cholesterol test   Would the patient rather a call back or a response via [facility]? Call back  Best Call Back Number: [phone]  Additional Information: Jihan Kelly (Mother) is requesting a call from nurse to discuss the pt will need a  cholesterol test order

## 2020-01-07 ENCOUNTER — LAB VISIT (OUTPATIENT)
Dept: LAB | Facility: HOSPITAL | Age: 25
End: 2020-01-07
Attending: PEDIATRICS
Payer: COMMERCIAL

## 2020-01-07 DIAGNOSIS — Z00.00 ROUTINE MEDICAL EXAM: ICD-10-CM

## 2020-01-07 LAB
ANION GAP SERPL CALC-SCNC: 10 MMOL/L (ref 8–16)
BUN SERPL-MCNC: 11 MG/DL (ref 6–20)
CALCIUM SERPL-MCNC: 9.6 MG/DL (ref 8.7–10.5)
CHLORIDE SERPL-SCNC: 104 MMOL/L (ref 95–110)
CHOLEST SERPL-MCNC: 201 MG/DL (ref 120–199)
CHOLEST/HDLC SERPL: 3.8 {RATIO} (ref 2–5)
CO2 SERPL-SCNC: 28 MMOL/L (ref 23–29)
CREAT SERPL-MCNC: 1.1 MG/DL (ref 0.5–1.4)
EST. GFR  (AFRICAN AMERICAN): >60 ML/MIN/1.73 M^2
EST. GFR  (NON AFRICAN AMERICAN): >60 ML/MIN/1.73 M^2
ESTIMATED AVG GLUCOSE: 114 MG/DL (ref 68–131)
GLUCOSE SERPL-MCNC: 86 MG/DL (ref 70–110)
HBA1C MFR BLD HPLC: 5.6 % (ref 4–5.6)
HDLC SERPL-MCNC: 53 MG/DL (ref 40–75)
HDLC SERPL: 26.4 % (ref 20–50)
LDLC SERPL CALC-MCNC: 119.4 MG/DL (ref 63–159)
NONHDLC SERPL-MCNC: 148 MG/DL
POTASSIUM SERPL-SCNC: 3.9 MMOL/L (ref 3.5–5.1)
SODIUM SERPL-SCNC: 142 MMOL/L (ref 136–145)
TRIGL SERPL-MCNC: 143 MG/DL (ref 30–150)
TSH SERPL DL<=0.005 MIU/L-ACNC: 2.57 UIU/ML (ref 0.4–4)

## 2020-01-07 PROCEDURE — 36415 COLL VENOUS BLD VENIPUNCTURE: CPT

## 2020-01-07 PROCEDURE — 80048 BASIC METABOLIC PNL TOTAL CA: CPT

## 2020-01-07 PROCEDURE — 80061 LIPID PANEL: CPT

## 2020-01-07 PROCEDURE — 83036 HEMOGLOBIN GLYCOSYLATED A1C: CPT

## 2020-01-07 PROCEDURE — 84443 ASSAY THYROID STIM HORMONE: CPT

## 2020-01-20 ENCOUNTER — TELEPHONE (OUTPATIENT)
Dept: PEDIATRICS | Facility: CLINIC | Age: 25
End: 2020-01-20

## 2020-01-20 NOTE — TELEPHONE ENCOUNTER
----- Message from Mark Bennett sent at 1/20/2020  4:35 PM CST -----  Contact: NACHO CARBALLOKERI-Pt's mother  Type:  Needs Medical Advice    Who Called: JUAN CARBALLO-Pt's mother  Symptoms (please be specific): n/a   How long has patient had these symptoms:  n/a  Pharmacy name and phone #:  n/a  Would the patient rather a call back or a response via [facility]? Call back   Best Call Back Number: [phone]  Additional Information: She is requesting a letter to be drafted stating DR. Ramos is his PCP as well all of his past treatments from 2019 until presently due to the patient being diagnosis with autism and needs this information to renew his social security for this year. The deadline for this letter being drafted will be 01/27/2020 and the letter is due by 02/01/2020.

## 2020-01-21 NOTE — TELEPHONE ENCOUNTER
Called # listed, call was picked up and then disconnected. Tried calling again and fax tone picked up. Faxed letters to number below.

## 2020-01-21 NOTE — TELEPHONE ENCOUNTER
Called number listed, left message informing that letters are printed and ready to be picked up. Requested mother to contact clinic to let us know how she would like to receive it.

## 2020-01-21 NOTE — TELEPHONE ENCOUNTER
Mike Hernandez V Staff   Caller: Pt (Today,  1:55 PM)             Please give pt a call at .[phone] (home) she is returning the nurse call

## 2020-01-21 NOTE — TELEPHONE ENCOUNTER
S/w mother and informed that letters are at the  ready to be picked up. Mother verbalized understanding and she stated she also needs a copy of last clinic note, labs and 90L.

## 2020-01-22 ENCOUNTER — TELEPHONE (OUTPATIENT)
Dept: PEDIATRICS | Facility: CLINIC | Age: 25
End: 2020-01-22

## 2020-01-22 NOTE — TELEPHONE ENCOUNTER
----- Message from Lynda Wilson sent at 1/22/2020 11:42 AM CST -----  Contact: Mother  Request a call from Kalani concerning paperwork that should've been left at the front dest on this pt, no additional info given and can be reached at [phone] or [phone]///[email or web address]

## 2020-01-22 NOTE — TELEPHONE ENCOUNTER
Called cell number, no answer. Left message informing that paperwork was left at  yesterday for her to . Requested that mother call clinic back if she had any other questions. Also tried calling home number, call disconnected.

## 2020-01-30 ENCOUNTER — TELEPHONE (OUTPATIENT)
Dept: PEDIATRICS | Facility: CLINIC | Age: 25
End: 2020-01-30

## 2020-01-30 NOTE — TELEPHONE ENCOUNTER
S/w mother and informed that I just checked at the  and all requested documents are in an envelope and I left it with Asher at the . Mother verbalized understanding.

## 2020-01-30 NOTE — TELEPHONE ENCOUNTER
----- Message from Candace Avelar sent at 1/30/2020  2:52 PM CST -----  Contact: pt mom   Caller request call back needing staement from Dr. 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 Also  Needing print out or any test that has been 1yr not a school slip must be separate letters ... Call back: [phone] (home) mom needs information today to submit in office 1/31

## 2020-06-08 ENCOUNTER — TELEPHONE (OUTPATIENT)
Dept: INTERNAL MEDICINE | Facility: CLINIC | Age: 25
End: 2020-06-08

## 2020-06-08 NOTE — TELEPHONE ENCOUNTER
Returned call to pt's mother regarding 90L form. Advised mother that Dr. Ramos is currently out of the clinic but after reviewing the chart, it seems as if she completes the form every year. Appt scheduled for 06/10/20 @ 10:00am with Dr. Ramos. Pt's mother verbalized understanding.

## 2020-06-08 NOTE — TELEPHONE ENCOUNTER
----- Message from Cassandra Candelario sent at 6/8/2020 12:47 PM CDT -----  Contact: pt mother - Ursula   Is calling to see if the pt needs to be seen in order to have the 90L form filled out / mother has appts this week and wants to know and can be reached at [phone]//thanks/dbw     Tomorrow, wed or Friday are the dates the pt's mother has an appt if pt has to seen

## 2020-06-10 ENCOUNTER — OFFICE VISIT (OUTPATIENT)
Dept: PEDIATRICS | Facility: CLINIC | Age: 25
End: 2020-06-10
Payer: COMMERCIAL

## 2020-06-10 VITALS
HEART RATE: 65 BPM | WEIGHT: 170.63 LBS | BODY MASS INDEX: 25.57 KG/M2 | TEMPERATURE: 98 F | DIASTOLIC BLOOD PRESSURE: 72 MMHG | SYSTOLIC BLOOD PRESSURE: 118 MMHG

## 2020-06-10 DIAGNOSIS — Z00.00 WELL ADULT EXAM: Primary | ICD-10-CM

## 2020-06-10 DIAGNOSIS — F84.0 AUTISM: ICD-10-CM

## 2020-06-10 PROCEDURE — 99999 PR PBB SHADOW E&M-EST. PATIENT-LVL III: CPT | Mod: PBBFAC,,, | Performed by: PEDIATRICS

## 2020-06-10 PROCEDURE — 99999 PR PBB SHADOW E&M-EST. PATIENT-LVL III: ICD-10-PCS | Mod: PBBFAC,,, | Performed by: PEDIATRICS

## 2020-06-10 PROCEDURE — 99395 PREV VISIT EST AGE 18-39: CPT | Mod: S$GLB,,, | Performed by: PEDIATRICS

## 2020-06-10 PROCEDURE — 99395 PR PREVENTIVE VISIT,EST,18-39: ICD-10-PCS | Mod: S$GLB,,, | Performed by: PEDIATRICS

## 2020-06-10 NOTE — PROGRESS NOTES
Subjective:      Rome Kelly is a 24 y.o. male here with grandmother. Patient brought in for Annual Exam      History of Present Illness:  This 24-year-old is here for a well exam.  He has a history of autism and obstructive sleep apnea.  He is completed and some classes at [employer] this last semester.  The end of the semester was done online because of the corona virus pandemic.  The patient reports he has not been sick and he has been doing well.  He has no current complaints.      Review of Systems   Constitutional: Negative for fever and unexpected weight change.   HENT: Negative for congestion and rhinorrhea.    Eyes: Negative for discharge and redness.   Respiratory: Negative for cough and wheezing.    Gastrointestinal: Negative for constipation, diarrhea and vomiting.   Genitourinary: Negative for decreased urine volume and difficulty urinating.   Musculoskeletal: Negative for arthralgias and joint swelling.   Skin: Negative for rash and wound.   Neurological: Negative for syncope and headaches.   Psychiatric/Behavioral: Negative for behavioral problems and sleep disturbance.       Objective:     Physical Exam   Constitutional: He appears well-developed and well-nourished. No distress.   HENT:   Head: Normocephalic and atraumatic.   Right Ear: Tympanic membrane and external ear normal.   Left Ear: Tympanic membrane and external ear normal.   Nose: Nose normal.   Mouth/Throat: Uvula is midline, oropharynx is clear and moist and mucous membranes are normal. Normal dentition.   Eyes: Pupils are equal, round, and reactive to light. Conjunctivae, EOM and lids are normal.   Neck: Trachea normal and normal range of motion. Neck supple. No thyromegaly present.   Cardiovascular: Normal rate, regular rhythm, S1 normal, S2 normal, normal heart sounds and normal pulses. Exam reveals no gallop and no friction rub.   No murmur heard.  Pulmonary/Chest: Effort normal and breath sounds normal. He  has no wheezes. He has no rales.   Abdominal: Soft. Normal appearance and bowel sounds are normal. He exhibits no mass. There is no hepatosplenomegaly. There is no tenderness. There is no rebound and no guarding.   Musculoskeletal: Normal range of motion.   No scoliosis.   Lymphadenopathy:     He has no cervical adenopathy.   Neurological: He is alert. He has normal strength. Coordination and gait normal.   Skin: Skin is warm and intact. No rash noted.   Psychiatric: He has a normal mood and affect. His speech is normal and behavior is normal.       Assessment:        1. Well adult exam    2. Autism         Plan:     Problem List Items Addressed This Visit     Autism      Other Visit Diagnoses     Well adult exam    -  Primary          90L completed, copied, and faxed    Age appropriate anticipatory guidance  All vaccine components discussed  Call with any concerns

## 2020-12-04 ENCOUNTER — LAB VISIT (OUTPATIENT)
Dept: LAB | Facility: HOSPITAL | Age: 25
End: 2020-12-04
Attending: PEDIATRICS
Payer: MEDICARE

## 2020-12-04 ENCOUNTER — TELEPHONE (OUTPATIENT)
Dept: PEDIATRICS | Facility: CLINIC | Age: 25
End: 2020-12-04

## 2020-12-04 ENCOUNTER — OFFICE VISIT (OUTPATIENT)
Dept: PEDIATRICS | Facility: CLINIC | Age: 25
End: 2020-12-04
Payer: MEDICARE

## 2020-12-04 VITALS
TEMPERATURE: 97 F | WEIGHT: 171.31 LBS | SYSTOLIC BLOOD PRESSURE: 112 MMHG | HEIGHT: 68 IN | BODY MASS INDEX: 25.96 KG/M2 | DIASTOLIC BLOOD PRESSURE: 70 MMHG

## 2020-12-04 DIAGNOSIS — F84.0 AUTISM: Primary | ICD-10-CM

## 2020-12-04 DIAGNOSIS — G47.33 OBSTRUCTIVE SLEEP APNEA: ICD-10-CM

## 2020-12-04 DIAGNOSIS — Z00.00 WELL ADULT EXAM: ICD-10-CM

## 2020-12-04 LAB
CHOLEST SERPL-MCNC: 198 MG/DL (ref 120–199)
CHOLEST/HDLC SERPL: 3.1 {RATIO} (ref 2–5)
GLUCOSE SERPL-MCNC: 89 MG/DL (ref 70–110)
HDLC SERPL-MCNC: 64 MG/DL (ref 40–75)
HDLC SERPL: 32.3 % (ref 20–50)
LDLC SERPL CALC-MCNC: 121.2 MG/DL (ref 63–159)
NONHDLC SERPL-MCNC: 134 MG/DL
TRIGL SERPL-MCNC: 64 MG/DL (ref 30–150)

## 2020-12-04 PROCEDURE — 90471 IMMUNIZATION ADMIN: CPT | Mod: PBBFAC

## 2020-12-04 PROCEDURE — 86803 HEPATITIS C AB TEST: CPT

## 2020-12-04 PROCEDURE — 99214 OFFICE O/P EST MOD 30 MIN: CPT | Mod: S$PBB,,, | Performed by: PEDIATRICS

## 2020-12-04 PROCEDURE — 86703 HIV-1/HIV-2 1 RESULT ANTBDY: CPT

## 2020-12-04 PROCEDURE — 99213 OFFICE O/P EST LOW 20 MIN: CPT | Mod: PBBFAC | Performed by: PEDIATRICS

## 2020-12-04 PROCEDURE — 36415 COLL VENOUS BLD VENIPUNCTURE: CPT

## 2020-12-04 PROCEDURE — 82947 ASSAY GLUCOSE BLOOD QUANT: CPT

## 2020-12-04 PROCEDURE — 80061 LIPID PANEL: CPT

## 2020-12-04 PROCEDURE — 99999 PR PBB SHADOW E&M-EST. PATIENT-LVL III: ICD-10-PCS | Mod: PBBFAC,,, | Performed by: PEDIATRICS

## 2020-12-04 PROCEDURE — 99214 PR OFFICE/OUTPT VISIT, EST, LEVL IV, 30-39 MIN: ICD-10-PCS | Mod: S$PBB,,, | Performed by: PEDIATRICS

## 2020-12-04 PROCEDURE — 99999 PR PBB SHADOW E&M-EST. PATIENT-LVL III: CPT | Mod: PBBFAC,,, | Performed by: PEDIATRICS

## 2020-12-04 NOTE — TELEPHONE ENCOUNTER
----- Message from Shaq Brian sent at 12/4/2020 12:09 PM CST -----  Contact: Vic  Would like to consult with nurse regarding a copy of his blood type for Rome and Kanika Kelly.  Please contact Vic (mom) @ [phone].  Thanks

## 2020-12-04 NOTE — TELEPHONE ENCOUNTER
Returned call to pt's mother regarding getting pt's blood type. No answer, left message advising mother to return call to clinic.

## 2020-12-07 LAB
HCV AB SERPL QL IA: NEGATIVE
HIV 1+2 AB+HIV1 P24 AG SERPL QL IA: NEGATIVE

## 2020-12-17 ENCOUNTER — TELEPHONE (OUTPATIENT)
Dept: PEDIATRICS | Facility: CLINIC | Age: 25
End: 2020-12-17

## 2020-12-21 ENCOUNTER — TELEPHONE (OUTPATIENT)
Dept: PEDIATRICS | Facility: CLINIC | Age: 25
End: 2020-12-21

## 2020-12-21 DIAGNOSIS — F84.0 AUTISM: Primary | ICD-10-CM

## 2020-12-21 NOTE — TELEPHONE ENCOUNTER
----- Message from Carolee Jimenez sent at 12/21/2020  3:22 PM CST -----  Contact: Pt mom/  .Type:  Patient Returning Call    Who Called Donato  Who Left Message for Patient: Yaneli  Does the patient know what this is regarding?: callback  Would the patient rather a call back or a response via [facility]? callback  Best Call Back Number: .[phone] (home)       Additional Information:

## 2020-12-21 NOTE — TELEPHONE ENCOUNTER
----- Message from Cecily Donaldson sent at 12/21/2020  2:50 PM CST -----  Contact: Lissa-mom  Type:  Patient Returning Call    Who Called: Lissa  Who Left Message for Patient:nurse  Does the patient know what this is regarding?:  Would the patient rather a call back or a response via [facility]? call  Best Call Back Number:[phone]  Additional Information:

## 2020-12-21 NOTE — TELEPHONE ENCOUNTER
Mother called wanting to know if a provider can but in lab order for minna to get blood type lab drawn.

## 2020-12-28 NOTE — PROGRESS NOTES
Subjective:      Rome Kelly is a 25 y.o. male here with patient and mother. Patient brought in for Well Child      History of Present Illness:  This 25-year-old is here for follow-up regarding his autism.  He is generally doing well.  He has not recently been sick.  He is attending virtual classes through [employer].  His mother reports no change in his appetite, sleep, or other behaviors.      Review of Systems   Constitutional: Negative for activity change, appetite change and fever.   HENT: Negative for congestion and rhinorrhea.    Eyes: Negative for discharge.   Respiratory: Negative for cough and wheezing.    Gastrointestinal: Negative for diarrhea and vomiting.   Genitourinary: Negative for decreased urine volume.   Skin: Negative for rash.   Neurological: Negative for headaches.       Objective:     Physical Exam  Constitutional:       General: He is not in acute distress.     Appearance: He is well-developed.   HENT:      Right Ear: External ear normal.      Left Ear: External ear normal.   Eyes:      Conjunctiva/sclera: Conjunctivae normal.      Pupils: Pupils are equal, round, and reactive to light.   Cardiovascular:      Rate and Rhythm: Normal rate and regular rhythm.      Heart sounds: Normal heart sounds. No murmur.   Pulmonary:      Effort: Pulmonary effort is normal.      Breath sounds: Normal breath sounds.   Abdominal:      General: Bowel sounds are normal.      Palpations: Abdomen is soft. There is no mass.      Tenderness: There is no abdominal tenderness.   Lymphadenopathy:      Cervical: No cervical adenopathy.   Skin:     General: Skin is warm.      Findings: No rash.   Neurological:      Mental Status: He is alert and oriented to person, place, and time.   Psychiatric:         Behavior: Behavior normal.         Assessment:        1. Well adult exam    2. Autism    3. Obstructive sleep apnea         Plan:     Problem List Items Addressed This Visit     Autism     Obstructive sleep apnea      Other Visit Diagnoses     Well adult exam    -  Primary    Relevant Orders    (In Office Administered) Tdap Vaccine (Completed)    Glucose, Fasting (Completed)    Hepatitis C Antibody (Completed)    HIV 1/2 Ag/Ab (4th Gen) (Completed)    Lipid Panel (Completed)            Age appropriate anticipatory guidance  All vaccine components discussed  Call with any concerns

## 2021-03-05 ENCOUNTER — TELEPHONE (OUTPATIENT)
Dept: PEDIATRICS | Facility: CLINIC | Age: 26
End: 2021-03-05

## 2021-04-07 ENCOUNTER — TELEPHONE (OUTPATIENT)
Dept: PEDIATRICS | Facility: CLINIC | Age: 26
End: 2021-04-07

## 2021-06-15 ENCOUNTER — TELEPHONE (OUTPATIENT)
Dept: PEDIATRICS | Facility: CLINIC | Age: 26
End: 2021-06-15

## 2021-07-07 ENCOUNTER — OFFICE VISIT (OUTPATIENT)
Dept: PEDIATRICS | Facility: CLINIC | Age: 26
End: 2021-07-07
Payer: MEDICARE

## 2021-07-07 VITALS
HEIGHT: 67 IN | DIASTOLIC BLOOD PRESSURE: 74 MMHG | BODY MASS INDEX: 27.48 KG/M2 | TEMPERATURE: 97 F | SYSTOLIC BLOOD PRESSURE: 110 MMHG | WEIGHT: 175.06 LBS

## 2021-07-07 DIAGNOSIS — F84.0 AUTISM: ICD-10-CM

## 2021-07-07 DIAGNOSIS — Z00.00 WELL ADULT EXAM: Primary | ICD-10-CM

## 2021-07-07 PROCEDURE — 99395 PR PREVENTIVE VISIT,EST,18-39: ICD-10-PCS | Mod: S$PBB,,, | Performed by: PEDIATRICS

## 2021-07-07 PROCEDURE — 99395 PREV VISIT EST AGE 18-39: CPT | Mod: S$PBB,,, | Performed by: PEDIATRICS

## 2021-07-07 PROCEDURE — 99212 OFFICE O/P EST SF 10 MIN: CPT | Mod: PBBFAC | Performed by: PEDIATRICS

## 2021-07-07 PROCEDURE — 99999 PR PBB SHADOW E&M-EST. PATIENT-LVL II: CPT | Mod: PBBFAC,,, | Performed by: PEDIATRICS

## 2021-07-07 PROCEDURE — 99999 PR PBB SHADOW E&M-EST. PATIENT-LVL II: ICD-10-PCS | Mod: PBBFAC,,, | Performed by: PEDIATRICS

## 2021-07-07 PROCEDURE — 90471 IMMUNIZATION ADMIN: CPT | Mod: PBBFAC

## 2021-07-22 ENCOUNTER — TELEPHONE (OUTPATIENT)
Dept: OPHTHALMOLOGY | Facility: CLINIC | Age: 26
End: 2021-07-22

## 2021-07-23 ENCOUNTER — TELEPHONE (OUTPATIENT)
Dept: OPHTHALMOLOGY | Facility: CLINIC | Age: 26
End: 2021-07-23

## 2021-08-03 ENCOUNTER — TELEPHONE (OUTPATIENT)
Dept: PEDIATRICS | Facility: CLINIC | Age: 26
End: 2021-08-03

## 2021-08-17 ENCOUNTER — OFFICE VISIT (OUTPATIENT)
Dept: OPHTHALMOLOGY | Facility: CLINIC | Age: 26
End: 2021-08-17
Payer: MEDICARE

## 2021-08-17 DIAGNOSIS — H52.03 HYPEROPIA, BILATERAL: ICD-10-CM

## 2021-08-17 DIAGNOSIS — H53.8 BLURRED VISION, BILATERAL: Primary | ICD-10-CM

## 2021-08-17 PROCEDURE — 92015 DETERMINE REFRACTIVE STATE: CPT | Mod: ,,, | Performed by: OPTOMETRIST

## 2021-08-17 PROCEDURE — 92004 COMPRE OPH EXAM NEW PT 1/>: CPT | Mod: S$PBB,,, | Performed by: OPTOMETRIST

## 2021-08-17 PROCEDURE — 99999 PR PBB SHADOW E&M-EST. PATIENT-LVL I: CPT | Mod: PBBFAC,,, | Performed by: OPTOMETRIST

## 2021-08-17 PROCEDURE — 92015 PR REFRACTION: ICD-10-PCS | Mod: ,,, | Performed by: OPTOMETRIST

## 2021-08-17 PROCEDURE — 92004 PR EYE EXAM, NEW PATIENT,COMPREHESV: ICD-10-PCS | Mod: S$PBB,,, | Performed by: OPTOMETRIST

## 2021-08-17 PROCEDURE — 99211 OFF/OP EST MAY X REQ PHY/QHP: CPT | Mod: PBBFAC | Performed by: OPTOMETRIST

## 2021-08-17 PROCEDURE — 99999 PR PBB SHADOW E&M-EST. PATIENT-LVL I: ICD-10-PCS | Mod: PBBFAC,,, | Performed by: OPTOMETRIST

## 2021-11-07 ENCOUNTER — NURSE TRIAGE (OUTPATIENT)
Dept: ADMINISTRATIVE | Facility: CLINIC | Age: 26
End: 2021-11-07
Payer: MEDICAID

## 2021-11-07 ENCOUNTER — HOSPITAL ENCOUNTER (EMERGENCY)
Facility: HOSPITAL | Age: 26
Discharge: HOME | End: 2021-11-07
Attending: EMERGENCY MEDICINE
Payer: MEDICARE

## 2021-11-07 VITALS
BODY MASS INDEX: 27.25 KG/M2 | RESPIRATION RATE: 15 BRPM | DIASTOLIC BLOOD PRESSURE: 65 MMHG | HEART RATE: 104 BPM | TEMPERATURE: 100 F | SYSTOLIC BLOOD PRESSURE: 122 MMHG | WEIGHT: 173.63 LBS | OXYGEN SATURATION: 93 % | HEIGHT: 67 IN

## 2021-11-07 DIAGNOSIS — A08.4 VIRAL GASTROENTERITIS: Primary | ICD-10-CM

## 2021-11-07 LAB
CTP QC/QA: YES
SARS-COV-2 RDRP RESP QL NAA+PROBE: NEGATIVE

## 2021-11-07 PROCEDURE — 25000003 PHARM REV CODE 250: Performed by: NURSE PRACTITIONER

## 2021-11-07 PROCEDURE — 99283 EMERGENCY DEPT VISIT LOW MDM: CPT | Mod: 25

## 2021-11-07 PROCEDURE — U0002 COVID-19 LAB TEST NON-CDC: HCPCS | Performed by: REGISTERED NURSE

## 2021-11-07 RX ORDER — ONDANSETRON 4 MG/1
4 TABLET, ORALLY DISINTEGRATING ORAL EVERY 8 HOURS PRN
Qty: 15 TABLET | Refills: 0 | Status: SHIPPED | OUTPATIENT
Start: 2021-11-07 | End: 2022-06-08

## 2021-11-07 RX ORDER — ONDANSETRON 4 MG/1
4 TABLET, ORALLY DISINTEGRATING ORAL
Status: COMPLETED | OUTPATIENT
Start: 2021-11-07 | End: 2021-11-07

## 2021-11-07 RX ADMIN — ONDANSETRON 4 MG: 4 TABLET, ORALLY DISINTEGRATING ORAL at 10:11

## 2021-11-08 ENCOUNTER — TELEPHONE (OUTPATIENT)
Dept: PEDIATRICS | Facility: CLINIC | Age: 26
End: 2021-11-08
Payer: MEDICAID

## 2021-11-10 ENCOUNTER — IMMUNIZATION (OUTPATIENT)
Dept: PRIMARY CARE CLINIC | Facility: CLINIC | Age: 26
End: 2021-11-10
Payer: COMMERCIAL

## 2021-11-10 DIAGNOSIS — Z23 NEED FOR VACCINATION: Primary | ICD-10-CM

## 2021-11-10 PROCEDURE — 0004A COVID-19, MRNA, LNP-S, PF, 30 MCG/0.3 ML DOSE VACCINE: CPT | Mod: CV19,PBBFAC | Performed by: FAMILY MEDICINE

## 2022-03-16 ENCOUNTER — TELEPHONE (OUTPATIENT)
Dept: PEDIATRICS | Facility: CLINIC | Age: 27
End: 2022-03-16
Payer: MEDICARE

## 2022-03-16 NOTE — TELEPHONE ENCOUNTER
----- Message from Victorina Strong sent at 3/16/2022  3:19 PM CDT -----  Contact: Mother [phone]  Mother is requesting for a referral to be sent to Dr. Syed in Psychiatry. Please advise

## 2022-03-16 NOTE — TELEPHONE ENCOUNTER
LVM for pt's mother to return call to clinic in regards to getting referral to Dr. Alvarado. //ANTONIO

## 2022-03-17 ENCOUNTER — TELEPHONE (OUTPATIENT)
Dept: PEDIATRICS | Facility: CLINIC | Age: 27
End: 2022-03-17
Payer: MEDICARE

## 2022-03-17 DIAGNOSIS — F84.0 AUTISM: Primary | ICD-10-CM

## 2022-03-17 NOTE — LETTER
March 21, 2022    Rome Kelly  [address] [address]  [facility] [address]         [facility]  [address] [facility] [address]  Phone: [phone]  Fax: [phone] March 21, 2022     Patient: Rome Kelly   YOB: 1995   Date of Visit: 3/17/2022       To Whom It May Concern:    Rome Kelly will now be cared for by a new primary care provider, Dr. Lauri Milan, at [facility].    If you have any questions or concerns, please don't hesitate to call.    Sincerely,          Mary PEÑA MD

## 2022-03-17 NOTE — TELEPHONE ENCOUNTER
Returned call to pt's mother in regards to getting referral for pt to see Dr. Alvarado. I informed mother that Dr. Ramos will enter referral and she'll get a call to schedule an appointment. I also informed mother due to pt and siblings age she will no longer be able over their care and would like to refer them to Dr. Lauri Milan. Mother scheduled an appointment for June 8th and states she will need a letter for insurance stating she will no longer be their primary doctor. Mother thanked me and ended call. //BJ

## 2022-03-17 NOTE — TELEPHONE ENCOUNTER
----- Message from Geronimo Carballo sent at 3/17/2022 10:04 AM CDT -----  Contact: [phone]  Patient mother is calling requesting referral for patient to see Dr Alvarado. Please call back at [phone] .da/shanti

## 2022-06-07 ENCOUNTER — TELEPHONE (OUTPATIENT)
Dept: PEDIATRICS | Facility: CLINIC | Age: 27
End: 2022-06-07
Payer: COMMERCIAL

## 2022-06-07 NOTE — LETTER
June 7, 2022    Rome Kelly  [address] [address]  [facility] [address]         [facility]  [address]  [place] [address]  Phone: [phone]  Fax: [phone] June 7, 2022     Patient: Rome Kelly   YOB: 1995   Date of Visit: 6/7/2022       To Whom It May Concern:      Rome Kelly has been my patient since July 12, 2006.  His diagnoses include autism and obstructive sleep apnea.    His last clinic visit with me was on 7/7/2021.      If you have any questions or concerns, please don't hesitate to call.    Sincerely,          Mary PEÑA MD

## 2022-06-07 NOTE — TELEPHONE ENCOUNTER
Returned call to pt's mother in regards to getting a letter stating patient was under Dr. Ramos's care and current diagnoses. I informed mother I would get message sent to Dr. Ramos and once letter is complete I will leave at . //BJ

## 2022-06-07 NOTE — TELEPHONE ENCOUNTER
----- Message from Gisell Cuevas sent at 6/7/2022  9:58 AM CDT -----  Contact: Mom [phone]  Would like to receive medical advice.    Would they like a call back or a response via [facility]:  call back once completed    Additional information: Calling to request a letter. Mom states she will need a letter stating provider was caring for pt and pt medical condition with diagnoses. Mom she will pickup late of tomorrow if possible.

## 2022-06-08 ENCOUNTER — OFFICE VISIT (OUTPATIENT)
Dept: INTERNAL MEDICINE | Facility: CLINIC | Age: 27
End: 2022-06-08
Payer: MEDICARE

## 2022-06-08 VITALS
TEMPERATURE: 97 F | BODY MASS INDEX: 28.52 KG/M2 | HEIGHT: 67 IN | OXYGEN SATURATION: 98 % | SYSTOLIC BLOOD PRESSURE: 120 MMHG | WEIGHT: 181.69 LBS | HEART RATE: 73 BPM | DIASTOLIC BLOOD PRESSURE: 60 MMHG

## 2022-06-08 DIAGNOSIS — E66.3 OVERWEIGHT (BMI 25.0-29.9): Chronic | ICD-10-CM

## 2022-06-08 DIAGNOSIS — Z13.1 SCREENING FOR DIABETES MELLITUS: ICD-10-CM

## 2022-06-08 DIAGNOSIS — Z00.00 PREVENTATIVE HEALTH CARE: Primary | ICD-10-CM

## 2022-06-08 DIAGNOSIS — G47.33 OBSTRUCTIVE SLEEP APNEA ON CPAP: Chronic | ICD-10-CM

## 2022-06-08 DIAGNOSIS — Z13.220 SCREENING FOR LIPID DISORDERS: ICD-10-CM

## 2022-06-08 DIAGNOSIS — F84.0 AUTISM: ICD-10-CM

## 2022-06-08 LAB
ALBUMIN SERPL BCP-MCNC: 4.2 G/DL (ref 3.5–5.2)
ALP SERPL-CCNC: 82 U/L (ref 55–135)
ALT SERPL W/O P-5'-P-CCNC: 48 U/L (ref 10–44)
ANION GAP SERPL CALC-SCNC: 9 MMOL/L (ref 8–16)
AST SERPL-CCNC: 38 U/L (ref 10–40)
BILIRUB SERPL-MCNC: 0.3 MG/DL (ref 0.1–1)
BUN SERPL-MCNC: 11 MG/DL (ref 6–20)
CALCIUM SERPL-MCNC: 9.9 MG/DL (ref 8.7–10.5)
CHLORIDE SERPL-SCNC: 106 MMOL/L (ref 95–110)
CHOLEST SERPL-MCNC: 203 MG/DL (ref 120–199)
CHOLEST/HDLC SERPL: 3.4 {RATIO} (ref 2–5)
CO2 SERPL-SCNC: 26 MMOL/L (ref 23–29)
CREAT SERPL-MCNC: 1 MG/DL (ref 0.5–1.4)
EST. GFR  (AFRICAN AMERICAN): >60 ML/MIN/1.73 M^2
EST. GFR  (NON AFRICAN AMERICAN): >60 ML/MIN/1.73 M^2
GLUCOSE SERPL-MCNC: 87 MG/DL (ref 70–110)
HDLC SERPL-MCNC: 60 MG/DL (ref 40–75)
HDLC SERPL: 29.6 % (ref 20–50)
LDLC SERPL CALC-MCNC: 127.8 MG/DL (ref 63–159)
NONHDLC SERPL-MCNC: 143 MG/DL
POTASSIUM SERPL-SCNC: 4.3 MMOL/L (ref 3.5–5.1)
PROT SERPL-MCNC: 7.8 G/DL (ref 6–8.4)
SODIUM SERPL-SCNC: 141 MMOL/L (ref 136–145)
TRIGL SERPL-MCNC: 76 MG/DL (ref 30–150)

## 2022-06-08 PROCEDURE — 3078F PR MOST RECENT DIASTOLIC BLOOD PRESSURE < 80 MM HG: ICD-10-PCS | Mod: CPTII,S$GLB,, | Performed by: FAMILY MEDICINE

## 2022-06-08 PROCEDURE — 99385 PREV VISIT NEW AGE 18-39: CPT | Mod: S$GLB,,, | Performed by: FAMILY MEDICINE

## 2022-06-08 PROCEDURE — 99999 PR PBB SHADOW E&M-EST. PATIENT-LVL III: ICD-10-PCS | Mod: PBBFAC,,, | Performed by: FAMILY MEDICINE

## 2022-06-08 PROCEDURE — 80061 LIPID PANEL: CPT | Performed by: FAMILY MEDICINE

## 2022-06-08 PROCEDURE — 3008F PR BODY MASS INDEX (BMI) DOCUMENTED: ICD-10-PCS | Mod: CPTII,S$GLB,, | Performed by: FAMILY MEDICINE

## 2022-06-08 PROCEDURE — 3074F SYST BP LT 130 MM HG: CPT | Mod: CPTII,S$GLB,, | Performed by: FAMILY MEDICINE

## 2022-06-08 PROCEDURE — 99385 PR PREVENTIVE VISIT,NEW,18-39: ICD-10-PCS | Mod: S$GLB,,, | Performed by: FAMILY MEDICINE

## 2022-06-08 PROCEDURE — 3008F BODY MASS INDEX DOCD: CPT | Mod: CPTII,S$GLB,, | Performed by: FAMILY MEDICINE

## 2022-06-08 PROCEDURE — 80053 COMPREHEN METABOLIC PANEL: CPT | Performed by: FAMILY MEDICINE

## 2022-06-08 PROCEDURE — 3074F PR MOST RECENT SYSTOLIC BLOOD PRESSURE < 130 MM HG: ICD-10-PCS | Mod: CPTII,S$GLB,, | Performed by: FAMILY MEDICINE

## 2022-06-08 PROCEDURE — 3078F DIAST BP <80 MM HG: CPT | Mod: CPTII,S$GLB,, | Performed by: FAMILY MEDICINE

## 2022-06-08 PROCEDURE — 99999 PR PBB SHADOW E&M-EST. PATIENT-LVL III: CPT | Mod: PBBFAC,,, | Performed by: FAMILY MEDICINE

## 2022-06-08 NOTE — ASSESSMENT & PLAN NOTE
"Wt Readings from Last 6 Encounters:   06/08/22 82.4 kg (181 lb 10.5 oz)   11/07/21 78.8 kg (173 lb 9.8 oz)   07/07/21 79.4 kg (175 lb 0.7 oz)   12/04/20 77.7 kg (171 lb 4.8 oz)   06/10/20 77.4 kg (170 lb 10.2 oz)   09/24/19 77.7 kg (171 lb 4.8 oz)     Estimated body mass index is 28.45 kg/m² as calculated from the following:    Height as of this encounter: 5' 7" (1.702 m).    Weight as of this encounter: 82.4 kg (181 lb 10.5 oz).    "

## 2022-06-08 NOTE — PROGRESS NOTES
"WELLNESS VISIT (PREVENTIVE SERVICES)  6/8/22  8:00 AM CDT    HEALTH MAINTENANCE INTERVENTIONS - UP TO DATE  Health Maintenance Topics with due status: Not Due       Topic Last Completion Date    Influenza Vaccine 10/11/2016    TETANUS VACCINE 12/04/2020       HEALTH MAINTENANCE INTERVENTIONS - DUE OR DUE SOON  There are no preventive care reminders to display for this patient.    CHIEF COMPLAINT: Annual Wellness Visit (Preventive Services)    This is my first time treating Rome. Any problems addressed today are NEW TO ME.  Rome is requesting that I assume the role of their primary care provider.    DIAGNOSES SPECIFICALLY EVALUATED AND TREATED THIS ENCOUNTER  1. Preventative health care  - Lipid Panel  - Comprehensive Metabolic Panel    2. Obstructive sleep apnea on CPAP    3. Overweight (BMI 25.0-29.9)  - Lipid Panel  - Ambulatory referral/consult to [facility]; Future    4. Screening for lipid disorders  - Lipid Panel    5. Screening for diabetes mellitus  - Comprehensive Metabolic Panel    6. Autism     Follow up in about 6 months (around 12/8/2022) for re-evaluate problem(s) discussed today.     Problem List Items Addressed This Visit     Autism (Chronic)    Obstructive sleep apnea on CPAP (Chronic)    Overview     SLEEP PHYSICIAN: Tristan Alexander MD ([facility]) [phone]           Overweight (BMI 25.0-29.9) (Chronic)    Current Assessment & Plan     Wt Readings from Last 6 Encounters:   06/08/22 82.4 kg (181 lb 10.5 oz)   11/07/21 78.8 kg (173 lb 9.8 oz)   07/07/21 79.4 kg (175 lb 0.7 oz)   12/04/20 77.7 kg (171 lb 4.8 oz)   06/10/20 77.4 kg (170 lb 10.2 oz)   09/24/19 77.7 kg (171 lb 4.8 oz)     Estimated body mass index is 28.45 kg/m² as calculated from the following:    Height as of this encounter: 5' 7" (1.702 m).    Weight as of this encounter: 82.4 kg (181 lb 10.5 oz).             Relevant Orders    Lipid Panel (Completed)    Ambulatory referral/consult to [facility] " "     Other Visit Diagnoses     Preventative health care    -  Primary    Relevant Orders    Lipid Panel (Completed)    Comprehensive Metabolic Panel (Completed)    Screening for lipid disorders        Relevant Orders    Lipid Panel (Completed)    Screening for diabetes mellitus        Relevant Orders    Comprehensive Metabolic Panel (Completed)      Unless noted herein, any chronic conditions are represented as and appear compensated/controlled and stable, and no other significant complaints or concerns were reported.    PRESCRIPTION DRUG MANAGEMENT  Outpatient Medications Prior to Visit   Medication Sig Dispense Refill    ondansetron (ZOFRAN-ODT) 4 MG TbDL Take 1 tablet (4 mg total) by mouth every 8 (eight) hours as needed. (Patient not taking: Reported on 6/8/2022) 15 tablet 0     No facility-administered medications prior to visit.     Medications Discontinued During This Encounter   Medication Reason    ondansetron (ZOFRAN-ODT) 4 MG TbDL Patient no longer taking      PHYSICAL EXAM  Vitals:    06/08/22 0900   BP: 120/60   BP Location: Left arm   Patient Position: Sitting   BP Method: Medium (Automatic)   Pulse: 73   Temp: 97.4 °F (36.3 °C)   TempSrc: Tympanic   SpO2: 98%   Weight: 82.4 kg (181 lb 10.5 oz)   Height: 5' 7" (1.702 m)   CONSTITUTIONAL: Vital signs noted. No apparent distress. Does not appear acutely ill or septic. Appears adequately hydrated.  EYE: Sclerae anicteric. Lids and conjunctiva unremarkable.  ENT: External ENT unremarkable. Hearing grossly intact.  PULM: Lungs clear. Breathing unlabored.  CV: Auscultation reveals regular rate and rhythm. Normal heart sounds. No carotid bruit.  GI: Soft and nontender. Bowel sounds normal.  DERM: Skin warm and moist with normal turgor.  NEURO: There are no gross focal motor deficits or gross deficits of cranial nerves III-XII.  PSYCH: Alert. Mood is grossly neutral. Autism affect.    PAST MEDICAL HISTORY  Rome has a past medical history of Autism, " "Obstructive sleep apnea on CPAP, and Sleep apnea.    SURGICAL HISTORY  Rome has a past surgical history that includes Inguinal hernia repair and Circumcision.    FAMILY HISTORY  Rome family history includes Autism spectrum disorder in his brother; Cataracts in his maternal grandmother; Diabetes in his mother; Obesity in his brother.     ALLERGIES  Review of patient's allergies indicates:  No Known Allergies    SOCIAL HISTORY  Rome  reports that he has never smoked. He has never used smokeless tobacco. He reports that he does not drink alcohol and does not use drugs.     Documentation entered by me for this encounter may have been done in part using speech-recognition technology. Although I have made an effort to ensure accuracy, "sound like" errors may exist and should be interpreted in context.   "

## 2022-06-14 ENCOUNTER — TELEPHONE (OUTPATIENT)
Dept: INTERNAL MEDICINE | Facility: CLINIC | Age: 27
End: 2022-06-14
Payer: MEDICARE

## 2022-06-15 ENCOUNTER — PATIENT MESSAGE (OUTPATIENT)
Dept: PRIMARY CARE CLINIC | Facility: CLINIC | Age: 27
End: 2022-06-15
Payer: COMMERCIAL

## 2022-06-15 ENCOUNTER — OFFICE VISIT (OUTPATIENT)
Dept: PRIMARY CARE CLINIC | Facility: CLINIC | Age: 27
End: 2022-06-15
Payer: MEDICARE

## 2022-06-15 VITALS
WEIGHT: 181.19 LBS | SYSTOLIC BLOOD PRESSURE: 118 MMHG | TEMPERATURE: 98 F | RESPIRATION RATE: 18 BRPM | DIASTOLIC BLOOD PRESSURE: 60 MMHG | HEIGHT: 67 IN | HEART RATE: 78 BPM | BODY MASS INDEX: 28.44 KG/M2

## 2022-06-15 DIAGNOSIS — G47.33 OBSTRUCTIVE SLEEP APNEA ON CPAP: Chronic | ICD-10-CM

## 2022-06-15 DIAGNOSIS — E66.3 OVERWEIGHT (BMI 25.0-29.9): Chronic | ICD-10-CM

## 2022-06-15 DIAGNOSIS — Z83.3 FAMILY HISTORY OF DIABETES MELLITUS: ICD-10-CM

## 2022-06-15 DIAGNOSIS — Z71.3 NUTRITIONAL COUNSELING: ICD-10-CM

## 2022-06-15 DIAGNOSIS — R63.5 WEIGHT GAIN: Primary | ICD-10-CM

## 2022-06-15 PROCEDURE — 3074F SYST BP LT 130 MM HG: CPT | Mod: CPTII,S$GLB,, | Performed by: FAMILY MEDICINE

## 2022-06-15 PROCEDURE — 99214 PR OFFICE/OUTPT VISIT, EST, LEVL IV, 30-39 MIN: ICD-10-PCS | Mod: S$GLB,,, | Performed by: FAMILY MEDICINE

## 2022-06-15 PROCEDURE — 1159F MED LIST DOCD IN RCRD: CPT | Mod: CPTII,S$GLB,, | Performed by: FAMILY MEDICINE

## 2022-06-15 PROCEDURE — 1159F PR MEDICATION LIST DOCUMENTED IN MEDICAL RECORD: ICD-10-PCS | Mod: CPTII,S$GLB,, | Performed by: FAMILY MEDICINE

## 2022-06-15 PROCEDURE — 99214 OFFICE O/P EST MOD 30 MIN: CPT | Mod: S$GLB,,, | Performed by: FAMILY MEDICINE

## 2022-06-15 PROCEDURE — 3008F PR BODY MASS INDEX (BMI) DOCUMENTED: ICD-10-PCS | Mod: CPTII,S$GLB,, | Performed by: FAMILY MEDICINE

## 2022-06-15 PROCEDURE — 99999 PR PBB SHADOW E&M-EST. PATIENT-LVL III: ICD-10-PCS | Mod: PBBFAC,,, | Performed by: FAMILY MEDICINE

## 2022-06-15 PROCEDURE — 1160F RVW MEDS BY RX/DR IN RCRD: CPT | Mod: CPTII,S$GLB,, | Performed by: FAMILY MEDICINE

## 2022-06-15 PROCEDURE — 99999 PR PBB SHADOW E&M-EST. PATIENT-LVL III: CPT | Mod: PBBFAC,,, | Performed by: FAMILY MEDICINE

## 2022-06-15 PROCEDURE — 3074F PR MOST RECENT SYSTOLIC BLOOD PRESSURE < 130 MM HG: ICD-10-PCS | Mod: CPTII,S$GLB,, | Performed by: FAMILY MEDICINE

## 2022-06-15 PROCEDURE — 3008F BODY MASS INDEX DOCD: CPT | Mod: CPTII,S$GLB,, | Performed by: FAMILY MEDICINE

## 2022-06-15 PROCEDURE — 3078F DIAST BP <80 MM HG: CPT | Mod: CPTII,S$GLB,, | Performed by: FAMILY MEDICINE

## 2022-06-15 PROCEDURE — 1160F PR REVIEW ALL MEDS BY PRESCRIBER/CLIN PHARMACIST DOCUMENTED: ICD-10-PCS | Mod: CPTII,S$GLB,, | Performed by: FAMILY MEDICINE

## 2022-06-15 PROCEDURE — 3078F PR MOST RECENT DIASTOLIC BLOOD PRESSURE < 80 MM HG: ICD-10-PCS | Mod: CPTII,S$GLB,, | Performed by: FAMILY MEDICINE

## 2022-06-15 NOTE — PROGRESS NOTES
Subjective:       Patient ID: Rome Kelly is a 26 y.o. male.    Chief Complaint: Weight Loss (PT PRESENTS TO CLINIC TODAY TO DISCUSS WEIGHT LOSS AND DIET.)    Pt here with mom (POA) and brother. Has gained weight and strong fam hx of DM II. Mom would like ideas about what to eat/meal plan to keep pt from gaining weight. Had pcp visit recently.  Compliant with cpap. Pt does work part time at [employer]. Denies any acute change in health conditions except some weight gain.     Mom/brother/GM live together. Typically eats processed cereal and 1% milk for lunch. Farber and chips for lunch.     Mom has fairly restrictive diet (but fresh fruits/veggies) a family member gave her--inquiring if it would be a good meal plan for patient. Is slightly picky eater for dinner but will try new things. Likes  salads and some veggies.     Brother is obese and pt is willing to make some changes; learning how to prepare his own food.     Referring MD: Dr Milan  PCP: same  BMI noted 28  Diet: variable; increased processed foods  Exercise/Activity:  Sleep: good; has cpap  Stressors: limited transportation  Anxiety/Depression Screen/PHQ-2:neg    Recent notes/labs reviewed; A1c 2020 was 5.6. No elevation of sugar or significant lipids at that time    HPI  Review of Systems   Constitutional: Negative for activity change, appetite change, fatigue and unexpected weight change.   HENT: Negative for mouth dryness.    Eyes: Negative for visual disturbance.   Respiratory: Negative for apnea, chest tightness and shortness of breath.    Cardiovascular: Negative for chest pain.   Gastrointestinal: Negative for abdominal pain.   Musculoskeletal: Negative for arthralgias and myalgias.   Integumentary:  Negative for rash.   Allergic/Immunologic: Negative for food allergies.   Psychiatric/Behavioral: Negative for behavioral problems and sleep disturbance. The patient is not nervous/anxious.          Objective:      Physical  Exam  Vitals and nursing note reviewed.   Constitutional:       General: He is not in acute distress.  HENT:      Head: Normocephalic and atraumatic.      Mouth/Throat:      Pharynx: Oropharynx is clear.   Eyes:      General: No scleral icterus.     Pupils: Pupils are equal, round, and reactive to light.   Neck:      Comments: No TM  Cardiovascular:      Rate and Rhythm: Normal rate and regular rhythm.      Pulses: Normal pulses.      Heart sounds: Normal heart sounds. No murmur heard.    No friction rub. No gallop.   Pulmonary:      Effort: Pulmonary effort is normal. No respiratory distress.      Breath sounds: Normal breath sounds. No wheezing, rhonchi or rales.   Abdominal:      General: Bowel sounds are normal. There is no distension.      Palpations: Abdomen is soft.      Tenderness: There is no abdominal tenderness.   Musculoskeletal:         General: No swelling.      Cervical back: Normal range of motion and neck supple. No tenderness.   Lymphadenopathy:      Cervical: No cervical adenopathy.   Skin:     General: Skin is warm.      Findings: No erythema or rash.   Neurological:      Mental Status: He is alert.   Psychiatric:         Mood and Affect: Mood normal.         Behavior: Behavior normal.         Assessment:       Problem List Items Addressed This Visit      1. Weight gain    2. Family history of diabetes mellitus    3. Overweight (BMI 25.0-29.9)    4. Nutritional counseling    5. Obstructive sleep apnea on CPAP               Plan:       Weight gain  Family history of diabetes mellitus  Overweight (BMI 25.0-29.9)  Will check A1c next visit if pt is amenable; will consider metformin  Many areas where pt can optimize lifestyle interventions that will not be costly; d/w him those  He is willing to try some of the same changes as his brother (also appt today)  He also just signed up for [employer] so will send a message to make sure he has no issues receiving.    Nutritional counseling  Extensive  counseling for pt/mom (POA)  2 veggies at night or 2 servings, decrease to 1 carb; 1 protein  Try for cheerios at breakfast instead of fruit loops  Sub out sweet tea for half sweet/half unsweet  D/W mom can consider repeat A1c next visit; may consider metformin as well given above diagnoses  Mom will call to schedule around her appointment/transportation  Did d/w mom the meal plan she presented to me had some good options for fruits/veggies but would make sure to have protein source and  Watch added salts; she may try to supplement lunch meals and dinner with the veggie/fruit options    Obstructive sleep apnea on CPAP  Chronic/stable    30 min + spent with patient and coordination of care

## 2022-06-15 NOTE — PATIENT INSTRUCTIONS
Work on less sugars-- honey nut cheerioos instead of froot loops  Add in some protein  Avoid sugar sweetened beverages  Add a veggie at lunch

## 2022-06-20 ENCOUNTER — TELEPHONE (OUTPATIENT)
Dept: PSYCHIATRY | Facility: CLINIC | Age: 27
End: 2022-06-20
Payer: MEDICARE

## 2022-06-22 ENCOUNTER — OFFICE VISIT (OUTPATIENT)
Dept: PSYCHIATRY | Facility: CLINIC | Age: 27
End: 2022-06-22
Payer: MEDICARE

## 2022-06-22 VITALS
HEIGHT: 67 IN | WEIGHT: 182.13 LBS | BODY MASS INDEX: 28.58 KG/M2 | SYSTOLIC BLOOD PRESSURE: 106 MMHG | HEART RATE: 58 BPM | DIASTOLIC BLOOD PRESSURE: 59 MMHG

## 2022-06-22 DIAGNOSIS — G47.33 OBSTRUCTIVE SLEEP APNEA ON CPAP: Chronic | ICD-10-CM

## 2022-06-22 DIAGNOSIS — F84.0 AUTISM SPECTRUM DISORDER REQUIRING SUBSTANTIAL SUPPORT (LEVEL 2): Primary | ICD-10-CM

## 2022-06-22 PROCEDURE — 99205 PR OFFICE/OUTPT VISIT, NEW, LEVL V, 60-74 MIN: ICD-10-PCS | Mod: S$GLB,,, | Performed by: PSYCHIATRY & NEUROLOGY

## 2022-06-22 PROCEDURE — 3074F PR MOST RECENT SYSTOLIC BLOOD PRESSURE < 130 MM HG: ICD-10-PCS | Mod: CPTII,S$GLB,, | Performed by: PSYCHIATRY & NEUROLOGY

## 2022-06-22 PROCEDURE — 99417 PR PROLONGED SVC, OUTPT, W/WO DIRECT PT CONTACT,  EA ADDTL 15 MIN: ICD-10-PCS | Mod: S$GLB,,, | Performed by: PSYCHIATRY & NEUROLOGY

## 2022-06-22 PROCEDURE — 3008F BODY MASS INDEX DOCD: CPT | Mod: CPTII,S$GLB,, | Performed by: PSYCHIATRY & NEUROLOGY

## 2022-06-22 PROCEDURE — 3078F PR MOST RECENT DIASTOLIC BLOOD PRESSURE < 80 MM HG: ICD-10-PCS | Mod: CPTII,S$GLB,, | Performed by: PSYCHIATRY & NEUROLOGY

## 2022-06-22 PROCEDURE — 3008F PR BODY MASS INDEX (BMI) DOCUMENTED: ICD-10-PCS | Mod: CPTII,S$GLB,, | Performed by: PSYCHIATRY & NEUROLOGY

## 2022-06-22 PROCEDURE — 3074F SYST BP LT 130 MM HG: CPT | Mod: CPTII,S$GLB,, | Performed by: PSYCHIATRY & NEUROLOGY

## 2022-06-22 PROCEDURE — 99999 PR PBB SHADOW E&M-EST. PATIENT-LVL II: CPT | Mod: PBBFAC,,, | Performed by: PSYCHIATRY & NEUROLOGY

## 2022-06-22 PROCEDURE — 99205 OFFICE O/P NEW HI 60 MIN: CPT | Mod: S$GLB,,, | Performed by: PSYCHIATRY & NEUROLOGY

## 2022-06-22 PROCEDURE — 3078F DIAST BP <80 MM HG: CPT | Mod: CPTII,S$GLB,, | Performed by: PSYCHIATRY & NEUROLOGY

## 2022-06-22 PROCEDURE — 99999 PR PBB SHADOW E&M-EST. PATIENT-LVL II: ICD-10-PCS | Mod: PBBFAC,,, | Performed by: PSYCHIATRY & NEUROLOGY

## 2022-06-22 PROCEDURE — 99417 PROLNG OP E/M EACH 15 MIN: CPT | Mod: S$GLB,,, | Performed by: PSYCHIATRY & NEUROLOGY
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
## 2022-07-19 ENCOUNTER — OFFICE VISIT (OUTPATIENT)
Dept: PRIMARY CARE CLINIC | Facility: CLINIC | Age: 27
End: 2022-07-19
Payer: MEDICARE

## 2022-07-19 VITALS
RESPIRATION RATE: 18 BRPM | HEART RATE: 95 BPM | OXYGEN SATURATION: 97 % | WEIGHT: 183 LBS | BODY MASS INDEX: 28.66 KG/M2

## 2022-07-19 DIAGNOSIS — F84.0 AUTISM: Chronic | ICD-10-CM

## 2022-07-19 DIAGNOSIS — G47.33 OBSTRUCTIVE SLEEP APNEA ON CPAP: Chronic | ICD-10-CM

## 2022-07-19 DIAGNOSIS — R63.5 WEIGHT GAIN: Primary | ICD-10-CM

## 2022-07-19 DIAGNOSIS — E66.3 OVERWEIGHT (BMI 25.0-29.9): Chronic | ICD-10-CM

## 2022-07-19 PROCEDURE — 99213 PR OFFICE/OUTPT VISIT, EST, LEVL III, 20-29 MIN: ICD-10-PCS | Mod: S$PBB,,, | Performed by: FAMILY MEDICINE

## 2022-07-19 PROCEDURE — 99999 PR PBB SHADOW E&M-EST. PATIENT-LVL III: CPT | Mod: PBBFAC,,, | Performed by: FAMILY MEDICINE

## 2022-07-19 PROCEDURE — 99213 OFFICE O/P EST LOW 20 MIN: CPT | Mod: S$PBB,,, | Performed by: FAMILY MEDICINE

## 2022-07-19 PROCEDURE — 99213 OFFICE O/P EST LOW 20 MIN: CPT | Mod: PBBFAC | Performed by: FAMILY MEDICINE

## 2022-07-19 PROCEDURE — 99999 PR PBB SHADOW E&M-EST. PATIENT-LVL III: ICD-10-PCS | Mod: PBBFAC,,, | Performed by: FAMILY MEDICINE

## 2022-07-19 NOTE — PROGRESS NOTES
Subjective:       Patient ID: Rome Kelly is a 26 y.o. male.  Pmhx, fam hx, soc hx, surg hx, allergies, med list reviewed    Chief Complaint: Follow-up (Pt presents to clinic for f/u)  Pt weight is stable/ up < 1 lb. Pt reports is still working at [employer]. He has been enjoying it. Tends to be pickier than his brother.   He feels like biggest weakness is cheese and pasta. Does feel like is eating more sweets than usual. Has switched to lower sugar ice cream.   Changed to turkey sausage. Got beignets this am but ate protein (the turkey sausage and egg beaters). Last week had egg whites.     Reviewed with patient serving sizes for pasta. Mom has limited this. Likes ramen noodles and pastaroni. Pt working to try to add protein (yesterday at chicken).        HPI  Review of Systems   Constitutional: Negative for activity change, appetite change, fatigue and unexpected weight change.   HENT: Negative for mouth dryness.    Eyes: Negative for visual disturbance.   Respiratory: Negative for apnea, chest tightness and shortness of breath.    Cardiovascular: Negative for chest pain.   Gastrointestinal: Negative for abdominal pain.   Musculoskeletal: Negative for arthralgias and myalgias.   Integumentary:  Negative for rash.   Allergic/Immunologic: Negative for food allergies.   Psychiatric/Behavioral: Negative for behavioral problems and sleep disturbance. The patient is not nervous/anxious.          Objective:      Physical Exam  Vitals and nursing note reviewed.   Constitutional:       General: He is not in acute distress.  HENT:      Head: Normocephalic and atraumatic.      Mouth/Throat:      Pharynx: Oropharynx is clear.   Eyes:      General: No scleral icterus.     Pupils: Pupils are equal, round, and reactive to light.   Neck:      Comments: No TM  Cardiovascular:      Rate and Rhythm: Normal rate and regular rhythm.      Pulses: Normal pulses.      Heart sounds: Normal heart sounds. No murmur heard.    No  friction rub. No gallop.   Pulmonary:      Effort: Pulmonary effort is normal. No respiratory distress.      Breath sounds: Normal breath sounds. No wheezing, rhonchi or rales.   Abdominal:      General: Bowel sounds are normal. There is no distension.      Palpations: Abdomen is soft.      Tenderness: There is no abdominal tenderness.   Musculoskeletal:         General: No swelling.      Cervical back: Normal range of motion and neck supple. No tenderness.   Lymphadenopathy:      Cervical: No cervical adenopathy.   Skin:     General: Skin is warm.      Findings: No erythema or rash.   Neurological:      Mental Status: He is alert and oriented to person, place, and time.   Psychiatric:         Mood and Affect: Mood normal.         Behavior: Behavior normal.         Assessment:       Problem List Items Addressed This Visit    None           ICD-10-CM ICD-9-CM   1. Weight gain  R63.5 783.1   2. Obstructive sleep apnea on CPAP  G47.33 327.23    Z99.89 V46.8   3. Autism  F84.0 299.00   4. Overweight (BMI 25.0-29.9)  E66.3 278.02     Plan:       Weight gain  Overweight (BMI 25.0-29.9)  Continued efforts lifestyle changes encouraged  Pt working on this: goal for next time is less sugar sweetened beverages  D/w them the amount of sugar in [place] tea; they are willing to try lower sugar version  Any exercise encouraged   Increase water; 1 low sugar drink/day or 1 regular sugar/week    Obstructive sleep apnea on CPAP  Stable; stable    Autism  Sees  Wear; stable           D/w pt and mom at length continued lifestyle changes encouraged; pt to work on decreasing sugar this week

## 2022-07-26 ENCOUNTER — IMMUNIZATION (OUTPATIENT)
Dept: PHARMACY | Facility: CLINIC | Age: 27
End: 2022-07-26
Payer: COMMERCIAL

## 2022-07-26 DIAGNOSIS — Z23 NEED FOR VACCINATION: Primary | ICD-10-CM

## 2022-08-09 ENCOUNTER — TELEPHONE (OUTPATIENT)
Dept: INTERNAL MEDICINE | Facility: CLINIC | Age: 27
End: 2022-08-09
Payer: MEDICARE

## 2022-08-09 NOTE — TELEPHONE ENCOUNTER
Spoke with patient's mother, Gudelia, and informed her that the insurance company will have to go through our medical records department for any records needed on the patient. She verbalized understanding.

## 2022-08-09 NOTE — TELEPHONE ENCOUNTER
----- Message from Lynda Wilson sent at 8/9/2022 11:15 AM CDT -----  Contact: MOther  Per mom please send the pt medical records to [facility] , no additional info given and can be reached at [phone]///[email or web address]

## 2022-08-17 ENCOUNTER — OFFICE VISIT (OUTPATIENT)
Dept: OPHTHALMOLOGY | Facility: CLINIC | Age: 27
End: 2022-08-17
Payer: MEDICARE

## 2022-08-17 DIAGNOSIS — H53.8 BLURRED VISION, BILATERAL: Primary | ICD-10-CM

## 2022-08-17 DIAGNOSIS — H52.03 HYPEROPIA, BILATERAL: ICD-10-CM

## 2022-08-17 PROCEDURE — 99999 PR PBB SHADOW E&M-EST. PATIENT-LVL II: ICD-10-PCS | Mod: PBBFAC,,, | Performed by: OPTOMETRIST

## 2022-08-17 PROCEDURE — 92014 PR EYE EXAM, EST PATIENT,COMPREHESV: ICD-10-PCS | Mod: S$GLB,,, | Performed by: OPTOMETRIST

## 2022-08-17 PROCEDURE — 92014 COMPRE OPH EXAM EST PT 1/>: CPT | Mod: S$GLB,,, | Performed by: OPTOMETRIST

## 2022-08-17 PROCEDURE — 1160F PR REVIEW ALL MEDS BY PRESCRIBER/CLIN PHARMACIST DOCUMENTED: ICD-10-PCS | Mod: CPTII,S$GLB,, | Performed by: OPTOMETRIST

## 2022-08-17 PROCEDURE — 1160F RVW MEDS BY RX/DR IN RCRD: CPT | Mod: CPTII,S$GLB,, | Performed by: OPTOMETRIST

## 2022-08-17 PROCEDURE — 1159F MED LIST DOCD IN RCRD: CPT | Mod: CPTII,S$GLB,, | Performed by: OPTOMETRIST

## 2022-08-17 PROCEDURE — 1159F PR MEDICATION LIST DOCUMENTED IN MEDICAL RECORD: ICD-10-PCS | Mod: CPTII,S$GLB,, | Performed by: OPTOMETRIST

## 2022-08-17 PROCEDURE — 92015 PR REFRACTION: ICD-10-PCS | Mod: S$GLB,,, | Performed by: OPTOMETRIST

## 2022-08-17 PROCEDURE — 99999 PR PBB SHADOW E&M-EST. PATIENT-LVL II: CPT | Mod: PBBFAC,,, | Performed by: OPTOMETRIST

## 2022-08-17 PROCEDURE — 92015 DETERMINE REFRACTIVE STATE: CPT | Mod: S$GLB,,, | Performed by: OPTOMETRIST

## 2022-08-17 NOTE — PROGRESS NOTES
HPI     Annual Exam     Comments: Pt mother states pt has fine vision just would like to check   No pain  No gtts          Last edited by Quinton Carty on 8/17/2022  9:39 AM. (History)            Assessment /Plan     For exam results, see Encounter Report.    gOCT performed normal OU    Blurred vision, bilateral    Hyperopia, bilateral    No correction required at this time  Discussed refraction with pt and family.     RTC 1 yr for dilated eye exam or PRN if any problems.   Discussed above and answered questions.

## 2022-09-19 ENCOUNTER — TELEPHONE (OUTPATIENT)
Dept: INTERNAL MEDICINE | Facility: CLINIC | Age: 27
End: 2022-09-19
Payer: COMMERCIAL

## 2022-09-19 ENCOUNTER — TELEPHONE (OUTPATIENT)
Dept: PEDIATRICS | Facility: CLINIC | Age: 27
End: 2022-09-19
Payer: COMMERCIAL

## 2022-09-19 NOTE — TELEPHONE ENCOUNTER
Spoke with patient's mother and informed her that there was no letter from Dr. Milan stating patient's health conditions, however there was one from 6/7/22 from Dr. Ramos. Printed and placed letter at the 2nd floor registration desk for her to  tomorrow. She verbalized understanding.

## 2022-09-19 NOTE — TELEPHONE ENCOUNTER
----- Message from Marta Pandya sent at 9/19/2022 11:30 AM CDT -----  Please call pt mom @ [phone] regarding letter she got from  Dr Milan, stating pt diagnose/condition, mom states she lost first paper given..

## 2022-09-19 NOTE — TELEPHONE ENCOUNTER
RN returned phone call and was able to locate letter. Mother requested letters be mailed.     ----- Message from Marta Pandya sent at 9/19/2022 11:33 AM CDT -----  Please call pt mom @ [phone] regarding a letter she received about pt condition/diagnosis , before pt begin to received Dr Milan.

## 2022-10-11 ENCOUNTER — TELEPHONE (OUTPATIENT)
Dept: INTERNAL MEDICINE | Facility: CLINIC | Age: 27
End: 2022-10-11
Payer: COMMERCIAL

## 2022-11-11 ENCOUNTER — TELEPHONE (OUTPATIENT)
Dept: PSYCHIATRY | Facility: CLINIC | Age: 27
End: 2022-11-11
Payer: COMMERCIAL

## 2022-11-11 ENCOUNTER — PATIENT MESSAGE (OUTPATIENT)
Dept: PSYCHIATRY | Facility: CLINIC | Age: 27
End: 2022-11-11
Payer: COMMERCIAL

## 2022-12-07 ENCOUNTER — OFFICE VISIT (OUTPATIENT)
Dept: INTERNAL MEDICINE | Facility: CLINIC | Age: 27
End: 2022-12-07
Payer: COMMERCIAL

## 2022-12-07 VITALS
TEMPERATURE: 97 F | OXYGEN SATURATION: 97 % | DIASTOLIC BLOOD PRESSURE: 61 MMHG | BODY MASS INDEX: 28.82 KG/M2 | SYSTOLIC BLOOD PRESSURE: 120 MMHG | HEART RATE: 60 BPM | HEIGHT: 67 IN | WEIGHT: 183.63 LBS

## 2022-12-07 DIAGNOSIS — J30.2 OTHER SEASONAL ALLERGIC RHINITIS: Primary | Chronic | ICD-10-CM

## 2022-12-07 DIAGNOSIS — F84.0 AUTISM: Chronic | ICD-10-CM

## 2022-12-07 DIAGNOSIS — E66.3 OVERWEIGHT (BMI 25.0-29.9): Chronic | ICD-10-CM

## 2022-12-07 DIAGNOSIS — G47.33 OBSTRUCTIVE SLEEP APNEA ON CPAP: Chronic | ICD-10-CM

## 2022-12-07 PROCEDURE — 99999 PR PBB SHADOW E&M-EST. PATIENT-LVL III: ICD-10-PCS | Mod: PBBFAC,,, | Performed by: FAMILY MEDICINE

## 2022-12-07 PROCEDURE — 3078F PR MOST RECENT DIASTOLIC BLOOD PRESSURE < 80 MM HG: ICD-10-PCS | Mod: CPTII,S$GLB,, | Performed by: FAMILY MEDICINE

## 2022-12-07 PROCEDURE — 3074F PR MOST RECENT SYSTOLIC BLOOD PRESSURE < 130 MM HG: ICD-10-PCS | Mod: CPTII,S$GLB,, | Performed by: FAMILY MEDICINE

## 2022-12-07 PROCEDURE — 3078F DIAST BP <80 MM HG: CPT | Mod: CPTII,S$GLB,, | Performed by: FAMILY MEDICINE

## 2022-12-07 PROCEDURE — 1160F RVW MEDS BY RX/DR IN RCRD: CPT | Mod: CPTII,S$GLB,, | Performed by: FAMILY MEDICINE

## 2022-12-07 PROCEDURE — 3008F PR BODY MASS INDEX (BMI) DOCUMENTED: ICD-10-PCS | Mod: CPTII,S$GLB,, | Performed by: FAMILY MEDICINE

## 2022-12-07 PROCEDURE — 99999 PR PBB SHADOW E&M-EST. PATIENT-LVL III: CPT | Mod: PBBFAC,,, | Performed by: FAMILY MEDICINE

## 2022-12-07 PROCEDURE — 3074F SYST BP LT 130 MM HG: CPT | Mod: CPTII,S$GLB,, | Performed by: FAMILY MEDICINE

## 2022-12-07 PROCEDURE — 3008F BODY MASS INDEX DOCD: CPT | Mod: CPTII,S$GLB,, | Performed by: FAMILY MEDICINE

## 2022-12-07 PROCEDURE — 99213 PR OFFICE/OUTPT VISIT, EST, LEVL III, 20-29 MIN: ICD-10-PCS | Mod: S$GLB,,, | Performed by: FAMILY MEDICINE

## 2022-12-07 PROCEDURE — 99213 OFFICE O/P EST LOW 20 MIN: CPT | Mod: S$GLB,,, | Performed by: FAMILY MEDICINE

## 2022-12-07 PROCEDURE — 1160F PR REVIEW ALL MEDS BY PRESCRIBER/CLIN PHARMACIST DOCUMENTED: ICD-10-PCS | Mod: CPTII,S$GLB,, | Performed by: FAMILY MEDICINE

## 2022-12-07 PROCEDURE — 1159F MED LIST DOCD IN RCRD: CPT | Mod: CPTII,S$GLB,, | Performed by: FAMILY MEDICINE

## 2022-12-07 PROCEDURE — 1159F PR MEDICATION LIST DOCUMENTED IN MEDICAL RECORD: ICD-10-PCS | Mod: CPTII,S$GLB,, | Performed by: FAMILY MEDICINE

## 2022-12-07 RX ORDER — FLUTICASONE PROPIONATE 50 MCG
2 SPRAY, SUSPENSION (ML) NASAL DAILY
Qty: 32 G | Refills: 5 | Status: SHIPPED | OUTPATIENT
Start: 2022-12-07 | End: 2023-05-16 | Stop reason: SDUPTHER

## 2022-12-07 NOTE — PROGRESS NOTES
"OFFICE VISIT 12/7/22  9:30 AM CST    CHIEF COMPLAINT: Follow-up    ASSESSMENT & PLAN  1. Seasonal allergic rhinitis  Assessment & Plan:  He describes typical chronic seasonal nasal congestion and rhinorrhea exacerbated by changes in weather. Associated with nonproductive cough when post-nasal drip is bad.    Orders:  -     fluticasone propionate (FLONASE) 50 mcg/actuation nasal spray; 2 sprays (100 mcg total) by Each Nostril route once daily.  Dispense: 32 g; Refill: 5    2. Overweight (BMI 25.0-29.9)  Assessment & Plan:  Still struggling, in spite of efforts at therapeutic lifestyle changes.  Wt Readings from Last 6 Encounters:   12/07/22 83.3 kg (183 lb 10.3 oz)   07/19/22 83 kg (182 lb 15.7 oz)   06/22/22 82.6 kg (182 lb 1.6 oz)   06/15/22 82.2 kg (181 lb 3.5 oz)   06/08/22 82.4 kg (181 lb 10.5 oz)   11/07/21 78.8 kg (173 lb 9.8 oz)     Estimated body mass index is 28.76 kg/m² as calculated from the following:    Height as of this encounter: 5' 7" (1.702 m).    Weight as of this encounter: 83.3 kg (183 lb 10.3 oz).        3. Obstructive sleep apnea on CPAP  Overview:  SLEEP PHYSICIAN: Tristan Alexander MD ([facility]) [phone]    Assessment & Plan:  This is a chronic problem, stable.       4. Autism  Assessment & Plan:  This is a chronic problem, stable.       Unless noted herein, any chronic conditions are represented as and appear compensated/controlled and stable, and no other significant complaints or concerns were reported.    Follow up in about 7 months (around 7/1/2023), or if symptoms worsen or fail to improve, for annual wellness exam.   Future Appointments   Date Time Provider Department Center   12/13/2022 10:15 AM PFIZER VACCINE, [facility]   12/20/2022 11:00 AM Lola Blake, PhD [facility]       PRESCRIPTION DRUG MANAGEMENT  Outpatient Medications Prior to Visit   Medication Sig Dispense Refill    COVID-19 vac, daniel,Pfizer,,PF, (PFIZER COVID-19 DANIEL VACCN,PF,) 30 mcg/0.3 mL " "injection Inject into the muscle. 0.3 mL 0     No facility-administered medications prior to visit.     Medications Discontinued During This Encounter   Medication Reason    COVID-19 vac, daniel,Pfizer,,PF, (PFIZER COVID-19 DANIEL VACCN,PF,) 30 mcg/0.3 mL injection Therapy completed     Medications Ordered This Encounter   Medications    fluticasone propionate (FLONASE) 50 mcg/actuation nasal spray     Si sprays (100 mcg total) by Each Nostril route once daily.     Dispense:  32 g     Refill:  5     PHYSICAL EXAM  Vitals:    22 0949   BP: 120/61   BP Location: Right arm   Patient Position: Sitting   BP Method: Large (Manual)   Pulse: 60   Temp: 96.7 °F (35.9 °C)   TempSrc: Tympanic   SpO2: 97%   Weight: 83.3 kg (183 lb 10.3 oz)   Height: 5' 7" (1.702 m)   Physical Exam  Vitals reviewed.   Constitutional:       General: He is not in acute distress.     Appearance: Normal appearance. He is not ill-appearing, toxic-appearing or diaphoretic.   HENT:      Right Ear: Tympanic membrane, ear canal and external ear normal. There is no impacted cerumen.      Left Ear: Tympanic membrane, ear canal and external ear normal. There is no impacted cerumen.      Nose: Congestion present. No rhinorrhea.      Mouth/Throat:      Mouth: Mucous membranes are moist.      Pharynx: Oropharynx is clear. No oropharyngeal exudate or posterior oropharyngeal erythema.   Eyes:      General:         Right eye: No discharge.         Left eye: No discharge.   Cardiovascular:      Rate and Rhythm: Normal rate and regular rhythm.      Heart sounds: Normal heart sounds.   Pulmonary:      Effort: Pulmonary effort is normal.      Breath sounds: Normal breath sounds.   Lymphadenopathy:      Cervical: No cervical adenopathy.   Skin:     General: Skin is warm and dry.   Neurological:      General: No focal deficit present.      Mental Status: He is alert and oriented to person, place, and time. Mental status is at baseline.   Psychiatric:         Mood " "and Affect: Mood normal.        Documentation entered by me for this encounter may have been done in part using speech-recognition technology. Although I have made an effort to ensure accuracy, "sound like" errors may exist and should be interpreted in context.   "

## 2022-12-07 NOTE — ASSESSMENT & PLAN NOTE
"Still struggling, in spite of efforts at therapeutic lifestyle changes.  Wt Readings from Last 6 Encounters:   12/07/22 83.3 kg (183 lb 10.3 oz)   07/19/22 83 kg (182 lb 15.7 oz)   06/22/22 82.6 kg (182 lb 1.6 oz)   06/15/22 82.2 kg (181 lb 3.5 oz)   06/08/22 82.4 kg (181 lb 10.5 oz)   11/07/21 78.8 kg (173 lb 9.8 oz)     Estimated body mass index is 28.76 kg/m² as calculated from the following:    Height as of this encounter: 5' 7" (1.702 m).    Weight as of this encounter: 83.3 kg (183 lb 10.3 oz).    "

## 2022-12-07 NOTE — ASSESSMENT & PLAN NOTE
He describes typical chronic seasonal nasal congestion and rhinorrhea exacerbated by changes in weather. Associated with nonproductive cough when post-nasal drip is bad.

## 2022-12-16 ENCOUNTER — PATIENT MESSAGE (OUTPATIENT)
Dept: PSYCHIATRY | Facility: CLINIC | Age: 27
End: 2022-12-16
Payer: COMMERCIAL

## 2022-12-20 ENCOUNTER — EVALUATION (OUTPATIENT)
Dept: PSYCHIATRY | Facility: CLINIC | Age: 27
End: 2022-12-20
Payer: MEDICARE

## 2022-12-20 DIAGNOSIS — F84.0 AUTISM SPECTRUM DISORDER REQUIRING SUBSTANTIAL SUPPORT (LEVEL 2): ICD-10-CM

## 2022-12-20 DIAGNOSIS — R45.4 DIFFICULTY CONTROLLING ANGER: Primary | ICD-10-CM

## 2022-12-20 PROCEDURE — 90791 PR PSYCHIATRIC DIAGNOSTIC EVALUATION: ICD-10-PCS | Mod: S$GLB,,, | Performed by: STUDENT IN AN ORGANIZED HEALTH CARE EDUCATION/TRAINING PROGRAM

## 2022-12-20 PROCEDURE — 90791 PSYCH DIAGNOSTIC EVALUATION: CPT | Mod: S$GLB,,, | Performed by: STUDENT IN AN ORGANIZED HEALTH CARE EDUCATION/TRAINING PROGRAM

## 2022-12-20 PROCEDURE — 99999 PR PBB SHADOW E&M-EST. PATIENT-LVL II: CPT | Mod: PBBFAC,,, | Performed by: STUDENT IN AN ORGANIZED HEALTH CARE EDUCATION/TRAINING PROGRAM

## 2022-12-20 PROCEDURE — 99999 PR PBB SHADOW E&M-EST. PATIENT-LVL II: ICD-10-PCS | Mod: PBBFAC,,, | Performed by: STUDENT IN AN ORGANIZED HEALTH CARE EDUCATION/TRAINING PROGRAM
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
## 2022-12-21 ENCOUNTER — IMMUNIZATION (OUTPATIENT)
Dept: PHARMACY | Facility: CLINIC | Age: 27
End: 2022-12-21
Payer: COMMERCIAL

## 2022-12-21 DIAGNOSIS — Z23 NEED FOR VACCINATION: Primary | ICD-10-CM

## 2023-01-06 ENCOUNTER — TELEPHONE (OUTPATIENT)
Dept: PSYCHIATRY | Facility: CLINIC | Age: 28
End: 2023-01-06
Payer: MEDICARE

## 2023-02-08 ENCOUNTER — TELEPHONE (OUTPATIENT)
Dept: PSYCHIATRY | Facility: CLINIC | Age: 28
End: 2023-02-08
Payer: MEDICARE

## 2023-02-08 NOTE — TELEPHONE ENCOUNTER
called to let patient know they are scheduled for 2/16 for 8am  ----- Message from Cassandra Candelario sent at 2/8/2023 10:19 AM CST -----  Contact: Pt mother _ demetricus  Type:  Test Results    Who Called:  pt mother  Name of Test (Lab/Mammo/Etc): eval / testing   Date of Test:  1/25  Ordering Provider:  Lou / Gonzalez braxton   [facility]   Would the patient rather a call back or a response via [facility]? phone  Best Call Back Number:  [phone]  Additional Information:

## 2023-02-14 ENCOUNTER — TELEPHONE (OUTPATIENT)
Dept: PSYCHIATRY | Facility: CLINIC | Age: 28
End: 2023-02-14
Payer: MEDICARE

## 2023-02-14 NOTE — TELEPHONE ENCOUNTER
Returned called/ left voicemail   ----- Message from Saadia Lizarraga MA sent at 2/14/2023  3:18 PM CST -----  Contact: Edel HydeMother    ----- Message -----  From: Lianahaley Ulices  Sent: 2/14/2023   2:05 PM CST  To: [facility] Staff    Mother stated that she needs sons appt to be on Tues or Wed, but not on a Thursday, says she has been leaving several of messages. Please call her back at [phone], Please call her to reschedule. Stated she can come tomorrow for an appt

## 2023-02-16 ENCOUNTER — TELEPHONE (OUTPATIENT)
Dept: PSYCHIATRY | Facility: CLINIC | Age: 28
End: 2023-02-16
Payer: MEDICARE

## 2023-03-08 ENCOUNTER — OFFICE VISIT (OUTPATIENT)
Dept: PSYCHIATRY | Facility: CLINIC | Age: 28
End: 2023-03-08
Payer: MEDICARE

## 2023-03-08 DIAGNOSIS — F41.1 GAD (GENERALIZED ANXIETY DISORDER): ICD-10-CM

## 2023-03-08 DIAGNOSIS — F81.0 SPECIFIC LEARNING DISORDER, WITH IMPAIRMENT IN READING, MILD: ICD-10-CM

## 2023-03-08 DIAGNOSIS — F81.81 SPECIFIC LEARNING DISORDER WITH IMPAIRMENT IN WRITTEN EXPRESSION: ICD-10-CM

## 2023-03-08 DIAGNOSIS — F81.2 SPECIFIC LEARNING DISORDER, WITH IMPAIRMENT IN MATHEMATICS, MODERATE: ICD-10-CM

## 2023-03-08 DIAGNOSIS — F84.0 AUTISM: Primary | ICD-10-CM

## 2023-03-08 PROCEDURE — 96131 PSYCL TST EVAL PHYS/QHP EA: CPT | Mod: 95,,, | Performed by: STUDENT IN AN ORGANIZED HEALTH CARE EDUCATION/TRAINING PROGRAM

## 2023-03-08 PROCEDURE — 90847 FAMILY PSYTX W/PT 50 MIN: CPT | Mod: 95,,, | Performed by: STUDENT IN AN ORGANIZED HEALTH CARE EDUCATION/TRAINING PROGRAM

## 2023-03-08 PROCEDURE — 1159F PR MEDICATION LIST DOCUMENTED IN MEDICAL RECORD: ICD-10-PCS | Mod: CPTII,95,, | Performed by: STUDENT IN AN ORGANIZED HEALTH CARE EDUCATION/TRAINING PROGRAM

## 2023-03-08 PROCEDURE — 96139 PSYCL/NRPSYC TST TECH EA: CPT | Mod: 95,,, | Performed by: STUDENT IN AN ORGANIZED HEALTH CARE EDUCATION/TRAINING PROGRAM

## 2023-03-08 PROCEDURE — 96130 PSYCL TST EVAL PHYS/QHP 1ST: CPT | Mod: 95,,, | Performed by: STUDENT IN AN ORGANIZED HEALTH CARE EDUCATION/TRAINING PROGRAM

## 2023-03-08 PROCEDURE — 96130 PR PSYCHOLOGIC TEST EVAL SVCS, 1ST HR: ICD-10-PCS | Mod: 95,,, | Performed by: STUDENT IN AN ORGANIZED HEALTH CARE EDUCATION/TRAINING PROGRAM

## 2023-03-08 PROCEDURE — 96139 PR PSYCH/NEUROPSYCH TEST ADMIN/SCORING, BY TECH, 2+ TESTS, EA ADDTL 30 MIN: ICD-10-PCS | Mod: 95,,, | Performed by: STUDENT IN AN ORGANIZED HEALTH CARE EDUCATION/TRAINING PROGRAM

## 2023-03-08 PROCEDURE — 96131 PR PSYCHOLOGIC TEST EVAL SVCS, EA ADDTL HR: ICD-10-PCS | Mod: 95,,, | Performed by: STUDENT IN AN ORGANIZED HEALTH CARE EDUCATION/TRAINING PROGRAM

## 2023-03-08 PROCEDURE — 96138 PR PSYCH/NEUROPSYCH TEST ADMIN/SCORING, BY TECH, 2+ TESTS, 1ST 30 MIN: ICD-10-PCS | Mod: 95,,, | Performed by: STUDENT IN AN ORGANIZED HEALTH CARE EDUCATION/TRAINING PROGRAM

## 2023-03-08 PROCEDURE — 90847 PR FAMILY PSYCHOTHERAPY W/ PT, 50 MIN: ICD-10-PCS | Mod: 95,,, | Performed by: STUDENT IN AN ORGANIZED HEALTH CARE EDUCATION/TRAINING PROGRAM

## 2023-03-08 PROCEDURE — 96138 PSYCL/NRPSYC TECH 1ST: CPT | Mod: 95,,, | Performed by: STUDENT IN AN ORGANIZED HEALTH CARE EDUCATION/TRAINING PROGRAM

## 2023-03-08 PROCEDURE — 1159F MED LIST DOCD IN RCRD: CPT | Mod: CPTII,95,, | Performed by: STUDENT IN AN ORGANIZED HEALTH CARE EDUCATION/TRAINING PROGRAM

## 2023-03-08 NOTE — LETTER
March 14, 2023        Manny Alvarado MD  [address]  [place] [address]             '[facility]  [id number] [place] [address] [address] [address]  Phone: [phone]  Fax: [phone]   Patient: Rome Kelly   MR Number: [id number]   YOB: 1995   Date of Visit: 3/8/2023       Dear Dr. Alvarado:    Thank you for referring Rome Kelly to me for evaluation. Below are the relevant portions of my assessment and plan of care.    Rome Kelly (age 27) presented at the [facility] for a psychoeducational assessment to examine the etiology of his learning and emotional difficulties.     Serving as significant normative strengths, results of Rome's psychoeducational assessment indicated that he was able to demonstrate high average skills in the areas of perceptual reasoning (84th percentile), within a highly accommodated stress- and distraction-free setting. Age-appropriate skills (i.e., 25th to <75th percentile) were evident in the areas of processing speed and basic reading skills.     Rome's global reading (2nd percentile), global mathematics (1st percentile) and global written language (8th percentile) skills were below the expected range considering Rome's age and grade-placement, he also exhibited significant impairments in his reading fluency (2nd percentile), which are consistent with the diagnosis of Specific Learning Disorder (SLD) With Impairments in Reading. Further, significant impairments were evident in his math calculation skills (3rd percentile) and applied math problem solving (0.3 percentile), which are consistent with the diagnosis of Specific Learning Disorder (SLD) With Impairments in Mathematics. Lastly, Rome exhibited impairments in written expression (6th percentile) related to delays in the development of his spelling, sentence writing fluency, and writing samples (8th to 23rd  percentile), which are consistent with the diagnosis of Specific Learning Disorder (SLD) With Impairments in Written Language/Expression.     Lastly, the combination of Rome's history, semi-structured clinical interviews, and the test data provided by Rome indicated that he is experiencing clinically significant symptoms of anxiety that supported the diagnosis of Generalized Anxiety Disorder (DEANNA).      If you have questions, please do not hesitate to call me. I look forward to following Rome along with you.    Sincerely,         Lola Blake, PhD           CC  No Recipients

## 2023-03-14 NOTE — PROGRESS NOTES
[facility]  Psychology  Progress Note  Psychological Testing Results Session (PhD/LCSW)    Patient Name: Rome Kelly  MRN: [id number]    Patient Class: [facility]- [facility]  Primary Care Provider: UBALDO Milan MD    Psychiatry Visit (PhD/LCSW)  Family Psychotherapy w/Patient - CPT 63534    Date: 3/8/2023    Site: Telemed    The patient location is: Patient's home/ Patient reported that his/her location at the time of this visit was in the [facility]     Visit type: Virtual visit with synchronous audio and video     Each patient to whom he or she provides medical services by telemedicine is: (1) informed of the relationship between the physician and patient and the respective role of any other health care provider with respect to management of the patient; and (2) notified that he or she may decline to receive medical services by telemedicine and may withdraw from such care at any time.     Referral source: IMTIAZ Milan MD    Clinical status of patient: Outpatient    Rome Kelly, a 27 y.o. male, for resulting visit.  Met with patient and mother.    Chief complaint/reason for encounter: Psychological Evaluation and treatment recommendations    Summary: Rome Kelly (age 27) presented at the [facility] for a psychoeducational assessment to examine the etiology of his learning and emotional difficulties.     Serving as significant normative strengths, results of Rome's psychoeducational assessment indicated that he was able to demonstrate high average skills in the areas of perceptual reasoning (84th percentile), within a highly accommodated stress- and distraction-free setting. Age-appropriate skills (i.e., 25th to <75th percentile) were evident in the areas of processing speed and basic reading skills.     Rome's global reading (2nd percentile), global mathematics (1st percentile) and global written language (8th  percentile) skills were below the expected range considering Rome's age and grade-placement, he also exhibited significant impairments in his reading fluency (2nd percentile), which are consistent with the diagnosis of Specific Learning Disorder (SLD) With Impairments in Reading. Further, significant impairments were evident in his math calculation skills (3rd percentile) and applied math problem solving (0.3 percentile), which are consistent with the diagnosis of Specific Learning Disorder (SLD) With Impairments in Mathematics. Lastly, Rome exhibited impairments in written expression (6th percentile) related to delays in the development of his spelling, sentence writing fluency, and writing samples (8th to 23rd percentile), which are consistent with the diagnosis of Specific Learning Disorder (SLD) With Impairments in Written Language/Expression.     Lastly, the combination of Rome's history, semi-structured clinical interviews, and the test data provided by Rome indicated that he is experiencing clinically significant symptoms of anxiety that supported the diagnosis of Generalized Anxiety Disorder (DEANNA).     Diagnostic Impression - Plan:       ICD-10-CM ICD-9-CM   1. Autism  F84.0 299.00   2. 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 Specific learning disorder, with impairment in mathematics, moderate  F81.2 315.1   4. Specific learning disorder, with impairment in reading, mild  F81.0 315.00   5. DEANNA (generalized anxiety disorder)  F41.1 300.02       Plan:individual psychotherapy and consult psychiatrist for medication evaluation    Return to Clinic: as needed    Length of Service (minutes): 45          Lola Blake, PhD  Psychologist  [facility]

## 2023-03-16 ENCOUNTER — TELEPHONE (OUTPATIENT)
Dept: INTERNAL MEDICINE | Facility: CLINIC | Age: 28
End: 2023-03-16
Payer: MEDICARE

## 2023-03-16 DIAGNOSIS — E66.3 OVERWEIGHT (BMI 25.0-29.9): Chronic | ICD-10-CM

## 2023-03-16 DIAGNOSIS — Z13.1 SCREENING FOR DIABETES MELLITUS: Primary | ICD-10-CM

## 2023-03-16 NOTE — TELEPHONE ENCOUNTER
Called patient to schedule glucose tolerance test for her other son, and she would like the above listed patient to have a glucose tolerance test as well./CS

## 2023-03-17 ENCOUNTER — PATIENT MESSAGE (OUTPATIENT)
Dept: PSYCHIATRY | Facility: CLINIC | Age: 28
End: 2023-03-17
Payer: MEDICARE

## 2023-03-17 ENCOUNTER — TELEPHONE (OUTPATIENT)
Dept: INTERNAL MEDICINE | Facility: CLINIC | Age: 28
End: 2023-03-17
Payer: MEDICARE

## 2023-03-17 NOTE — TELEPHONE ENCOUNTER
----- Message from Derek Carballo sent at 3/17/2023  3:09 PM CDT -----  Contact: Isabela/Dario Marcos is needing a call back in regards to some results for lab work. Please give her a call back at [phone] .

## 2023-03-17 NOTE — TELEPHONE ENCOUNTER
Pts phone cutting in and out. Informed pt I would hang up and dial right back to see if the phone works better. Pt did not answer. LVM for pt to return call,

## 2023-03-20 ENCOUNTER — TELEPHONE (OUTPATIENT)
Dept: INTERNAL MEDICINE | Facility: CLINIC | Age: 28
End: 2023-03-20
Payer: MEDICARE

## 2023-03-20 NOTE — TELEPHONE ENCOUNTER
2-hour glucose tolerance test ordered as requested.    TO MY TEAM: Schedule 2-hour glucose tolerance test and schedule a virtual visit with me a few days later to review results. Thanks.    Orders Placed This Encounter    Glucose Tolerance 2 Hour

## 2023-04-04 ENCOUNTER — LAB VISIT (OUTPATIENT)
Dept: LAB | Facility: HOSPITAL | Age: 28
End: 2023-04-04
Attending: FAMILY MEDICINE
Payer: MEDICARE

## 2023-04-04 DIAGNOSIS — E66.3 OVERWEIGHT (BMI 25.0-29.9): Chronic | ICD-10-CM

## 2023-04-04 DIAGNOSIS — Z13.1 SCREENING FOR DIABETES MELLITUS: ICD-10-CM

## 2023-04-04 LAB
GLUCOSE SERPL-MCNC: 107 MG/DL
GLUCOSE SERPL-MCNC: 118 MG/DL
GLUCOSE SERPL-MCNC: 78 MG/DL (ref 70–110)

## 2023-04-04 PROCEDURE — 82951 GLUCOSE TOLERANCE TEST (GTT): CPT | Performed by: FAMILY MEDICINE

## 2023-04-04 PROCEDURE — 36415 COLL VENOUS BLD VENIPUNCTURE: CPT | Performed by: FAMILY MEDICINE

## 2023-04-10 ENCOUNTER — TELEPHONE (OUTPATIENT)
Dept: INTERNAL MEDICINE | Facility: CLINIC | Age: 28
End: 2023-04-10
Payer: MEDICARE

## 2023-04-10 NOTE — TELEPHONE ENCOUNTER
----- Message from Marianne Jarvis sent at 4/10/2023  2:15 PM CDT -----  Contact: Mother/Emely  .Type:  Patient Returning Call    Who Called:Emely   Who Left Message for Patient:Pranav  Does the patient know what this is regarding?:no  Would the patient rather a call back or a response via [facility]? Phone call   Best Call Back Number:[phone]  Additional Information:

## 2023-04-14 ENCOUNTER — OFFICE VISIT (OUTPATIENT)
Dept: INTERNAL MEDICINE | Facility: CLINIC | Age: 28
End: 2023-04-14
Payer: COMMERCIAL

## 2023-04-14 ENCOUNTER — DOCUMENTATION ONLY (OUTPATIENT)
Dept: INTERNAL MEDICINE | Facility: CLINIC | Age: 28
End: 2023-04-14

## 2023-04-14 DIAGNOSIS — E66.3 OVERWEIGHT (BMI 25.0-29.9): Primary | Chronic | ICD-10-CM

## 2023-04-14 DIAGNOSIS — F84.0 AUTISM: Chronic | ICD-10-CM

## 2023-04-14 PROCEDURE — 99213 OFFICE O/P EST LOW 20 MIN: CPT | Mod: 95,,, | Performed by: FAMILY MEDICINE

## 2023-04-14 PROCEDURE — 1159F PR MEDICATION LIST DOCUMENTED IN MEDICAL RECORD: ICD-10-PCS | Mod: CPTII,95,, | Performed by: FAMILY MEDICINE

## 2023-04-14 PROCEDURE — 1159F MED LIST DOCD IN RCRD: CPT | Mod: CPTII,95,, | Performed by: FAMILY MEDICINE

## 2023-04-14 PROCEDURE — 99213 PR OFFICE/OUTPT VISIT, EST, LEVL III, 20-29 MIN: ICD-10-PCS | Mod: 95,,, | Performed by: FAMILY MEDICINE

## 2023-04-14 PROCEDURE — 1160F PR REVIEW ALL MEDS BY PRESCRIBER/CLIN PHARMACIST DOCUMENTED: ICD-10-PCS | Mod: CPTII,95,, | Performed by: FAMILY MEDICINE

## 2023-04-14 PROCEDURE — 1160F RVW MEDS BY RX/DR IN RCRD: CPT | Mod: CPTII,95,, | Performed by: FAMILY MEDICINE

## 2023-04-14 NOTE — PROGRESS NOTES
TELEMEDICINE VIRTUAL VISIT  4/14/23  7:00 AM CDT    Visit Details: This visit was a telemedicine virtual visit with synchronous audio and video. Rome represented that his location at the time of this visit was in the [facility]. Rome chose and consented to receive these medical services by telemedicine.    Subjective   CHIEF COMPLAINT: Follow-up and Results    I met with Rome and his mother via virtual video visit. We reviewed and discussed Psychoeducational Evaluation report by Lola Blake, Ph.D. We reviewed and discussed recent lab results, including glucose tolerance tests, which showed no evidence of diabetes. We discussed at length therapeutic lifestyle changes for treatment of his overweight.    Follow-up  Pertinent negatives include no arthralgias, chest pain, headaches, joint swelling, neck pain, vomiting or weakness.     Lab Results   Component Value Date    HGBA1C 5.6 01/07/2020    HGBA1C 5.4 01/08/2018    HGBA1C 5.7 02/10/2014    HGBA1C 5.4 03/12/2012    HGBA1C 5.5 03/03/2008    GLU 87 06/08/2022    GLU 86 01/07/2020    GLU 97 07/19/2006     Lab Results   Component Value Date    GLUCFAST 78 04/04/2023    VJDL3XX 118 04/04/2023    AKRJ5SE 107 04/04/2023     Lab Results   Component Value Date    LABINSU 14 03/03/2008      Review of Systems   Constitutional:  Negative for activity change and unexpected weight change.   HENT:  Negative for hearing loss, rhinorrhea and trouble swallowing.    Eyes:  Negative for discharge and visual disturbance.   Respiratory:  Negative for chest tightness and wheezing.    Cardiovascular:  Negative for chest pain and palpitations.   Gastrointestinal:  Negative for blood in stool, constipation, diarrhea and vomiting.   Endocrine: Negative for polydipsia and polyuria.   Genitourinary:  Negative for difficulty urinating, hematuria and urgency.   Musculoskeletal:  Negative for arthralgias, joint swelling and neck pain.   Neurological:  Negative for weakness  "and headaches.   Psychiatric/Behavioral:  Negative for confusion and dysphoric mood.        Assessment and Plan   1. Overweight (BMI 25.0-29.9)    2. Autism    Unless noted herein, any chronic conditions are represented as and appear stable, and no other significant complaints or concerns were reported.    Future Appointments   Date Time Provider Department Center   6/7/2023  9:30 AM UBALDO Milan MD [facility]       Objective   Physical Exam  Constitutional:       General: He is not in acute distress.     Appearance: He is not ill-appearing.   Pulmonary:      Effort: Pulmonary effort is normal. No respiratory distress.   Skin:     Coloration: Skin is not jaundiced.   Neurological:      Mental Status: He is alert. Mental status is at baseline.   Psychiatric:         Mood and Affect: Mood normal.           TOTAL TIME evaluating and managing this patient for this encounter was greater than or equal to 23 minutes. This time was spent personally by me on the following activities: review of patient's past medical history, assessing age-appropriate health maintenance needs, review of any interval history, medication reconciliation, obtaining history from the patient, examination of the patient, review and interpretation of lab results, answering patient's questions about lab results, educating patient and answering their questions about treatment plan, goals of treatment, and follow-up, counseling on appropriate therapeutic lifestyle changes, discussing strategies to help overcome any barriers to adherence to treatment plan, and final documentation of the visit. This time was exclusive of any separately billable procedures for this patient and exclusive of time spent treating any other patients.    Documentation entered by me for this encounter may have been done in part using speech-recognition technology. Although I have made an effort to ensure accuracy, "sound like" errors may exist and should be " interpreted in context.    Each patient to whom medical services are provided by telemedicine is: (1) informed of the relationship between the physician and patient and the respective role of any other health care provider with respect to management of the patient; and (2) notified that he or she may decline to receive medical services by telemedicine and may withdraw from such care at any time.

## 2023-04-24 ENCOUNTER — TELEPHONE (OUTPATIENT)
Dept: INTERNAL MEDICINE | Facility: CLINIC | Age: 28
End: 2023-04-24
Payer: MEDICARE

## 2023-04-24 NOTE — TELEPHONE ENCOUNTER
----- Message from Shayy Guerra sent at 4/24/2023  4:34 PM CDT -----  Contact: Carlo/mother  Patient ,other is calling to speak with the nurse regarding appointment . Explains will like to know if patient need to see [facility] due patient weight on the 05/16/2023(Dr. Gonzalez) along with patient sibling . Please give a call back at .[phone] as requested.  Thanks  LR

## 2023-04-25 ENCOUNTER — TELEPHONE (OUTPATIENT)
Dept: INTERNAL MEDICINE | Facility: CLINIC | Age: 28
End: 2023-04-25
Payer: MEDICARE

## 2023-04-25 NOTE — TELEPHONE ENCOUNTER
----- Message from Camilo Rojas sent at 4/25/2023 11:07 AM CDT -----  Contact: Henri hollis mother  Type:  Patient Returning Call    Who Called:Eloise Kelly   Who Left Message for Patient: nurse   Does the patient know what this is regarding?:appointment   Would the patient rather a call back or a response via [facility]? Call back   Best Call Back Number: [phone]   Additional Information:

## 2023-04-25 NOTE — TELEPHONE ENCOUNTER
Pts mom is asking if son should see [facility] per last virtual visit discussion on 4/14/23

## 2023-04-25 NOTE — TELEPHONE ENCOUNTER
The Lifestyle referral order was placed in June of last year. The goal would be education of the parents/caregivers for providing a healthy food environment.    I'm CC: Dr. Gonzalez to get her opinion.    @Dr. Gonzalez: If you feel it would be valuable, would you please have someone from your office contact his mother to schedule? If not, let us know, and my team can relay the message to the mother.    Thank you all for your help caring for our patients!    -BRIANA

## 2023-04-25 NOTE — TELEPHONE ENCOUNTER
----- Message from Pramod Monreal sent at 4/25/2023 11:48 AM CDT -----  Contact: Ms. Kelly/ Mother  .Type:  Patient Returning Call    Who Called:Mother   Who Left Message for Patient: Hiro Rajput MA   Does the patient know what this is regarding?:   Would the patient rather a call back or a response via [facility]?  Call   Best Call Back Number: [phone]

## 2023-05-15 ENCOUNTER — TELEPHONE (OUTPATIENT)
Dept: PRIMARY CARE CLINIC | Facility: CLINIC | Age: 28
End: 2023-05-15
Payer: MEDICARE

## 2023-05-16 ENCOUNTER — OFFICE VISIT (OUTPATIENT)
Dept: PRIMARY CARE CLINIC | Facility: CLINIC | Age: 28
End: 2023-05-16
Payer: MEDICARE

## 2023-05-16 VITALS
OXYGEN SATURATION: 97 % | HEIGHT: 67 IN | TEMPERATURE: 98 F | RESPIRATION RATE: 16 BRPM | BODY MASS INDEX: 28.75 KG/M2 | WEIGHT: 183.19 LBS | DIASTOLIC BLOOD PRESSURE: 64 MMHG | SYSTOLIC BLOOD PRESSURE: 122 MMHG

## 2023-05-16 DIAGNOSIS — J30.2 OTHER SEASONAL ALLERGIC RHINITIS: Chronic | ICD-10-CM

## 2023-05-16 DIAGNOSIS — R63.5 WEIGHT GAIN: Primary | ICD-10-CM

## 2023-05-16 DIAGNOSIS — G47.33 OBSTRUCTIVE SLEEP APNEA ON CPAP: Chronic | ICD-10-CM

## 2023-05-16 DIAGNOSIS — F84.0 AUTISM: Chronic | ICD-10-CM

## 2023-05-16 DIAGNOSIS — E66.3 OVERWEIGHT (BMI 25.0-29.9): Chronic | ICD-10-CM

## 2023-05-16 PROCEDURE — 1159F PR MEDICATION LIST DOCUMENTED IN MEDICAL RECORD: ICD-10-PCS | Mod: CPTII,S$GLB,, | Performed by: FAMILY MEDICINE

## 2023-05-16 PROCEDURE — 1160F RVW MEDS BY RX/DR IN RCRD: CPT | Mod: CPTII,S$GLB,, | Performed by: FAMILY MEDICINE

## 2023-05-16 PROCEDURE — 3078F PR MOST RECENT DIASTOLIC BLOOD PRESSURE < 80 MM HG: ICD-10-PCS | Mod: CPTII,S$GLB,, | Performed by: FAMILY MEDICINE

## 2023-05-16 PROCEDURE — 3074F PR MOST RECENT SYSTOLIC BLOOD PRESSURE < 130 MM HG: ICD-10-PCS | Mod: CPTII,S$GLB,, | Performed by: FAMILY MEDICINE

## 2023-05-16 PROCEDURE — 99214 PR OFFICE/OUTPT VISIT, EST, LEVL IV, 30-39 MIN: ICD-10-PCS | Mod: S$GLB,,, | Performed by: FAMILY MEDICINE

## 2023-05-16 PROCEDURE — 1159F MED LIST DOCD IN RCRD: CPT | Mod: CPTII,S$GLB,, | Performed by: FAMILY MEDICINE

## 2023-05-16 PROCEDURE — 99999 PR PBB SHADOW E&M-EST. PATIENT-LVL III: ICD-10-PCS | Mod: PBBFAC,,, | Performed by: FAMILY MEDICINE

## 2023-05-16 PROCEDURE — 99214 OFFICE O/P EST MOD 30 MIN: CPT | Mod: S$GLB,,, | Performed by: FAMILY MEDICINE

## 2023-05-16 PROCEDURE — 3078F DIAST BP <80 MM HG: CPT | Mod: CPTII,S$GLB,, | Performed by: FAMILY MEDICINE

## 2023-05-16 PROCEDURE — 99999 PR PBB SHADOW E&M-EST. PATIENT-LVL III: CPT | Mod: PBBFAC,,, | Performed by: FAMILY MEDICINE

## 2023-05-16 PROCEDURE — 3008F PR BODY MASS INDEX (BMI) DOCUMENTED: ICD-10-PCS | Mod: CPTII,S$GLB,, | Performed by: FAMILY MEDICINE

## 2023-05-16 PROCEDURE — 1160F PR REVIEW ALL MEDS BY PRESCRIBER/CLIN PHARMACIST DOCUMENTED: ICD-10-PCS | Mod: CPTII,S$GLB,, | Performed by: FAMILY MEDICINE

## 2023-05-16 PROCEDURE — 3074F SYST BP LT 130 MM HG: CPT | Mod: CPTII,S$GLB,, | Performed by: FAMILY MEDICINE

## 2023-05-16 PROCEDURE — 3008F BODY MASS INDEX DOCD: CPT | Mod: CPTII,S$GLB,, | Performed by: FAMILY MEDICINE

## 2023-05-16 RX ORDER — FLUTICASONE PROPIONATE 50 MCG
2 SPRAY, SUSPENSION (ML) NASAL DAILY
Qty: 32 G | Refills: 6 | Status: SHIPPED | OUTPATIENT
Start: 2023-05-16 | End: 2023-06-07 | Stop reason: SDUPTHER

## 2023-05-16 NOTE — PROGRESS NOTES
"Subjective   Pmhx, fam hx, soc hx, surg hx, allergies, med list reviewed   Wt Readings from Last 3 Encounters:   05/16/23 0928 83.1 kg (183 lb 3.2 oz)   12/07/22 0949 83.3 kg (183 lb 10.3 oz)   07/19/22 0951 83 kg (182 lb 15.7 oz)      Patient ID: Rome Kelly is a 27 y.o. male.    Chief Complaint: follow up weight gain    Pt reports working at [employer] 3 days/week. Has made lifestyle interventions: changed to less fried foods. Not a sweets eater. Likes pasta; has added some veggies in. Good with tech. Enjoys helping. Feels like no impulsive eating. Willing to try new things.     Pt feels like he is doing "pretty well" with exercise. No weight gain.    Sees Dr. Blake. Has done well overall.     He is compliant with his CPAP.    Pt has not lost weight but overall feels like more energy.       HPI  Review of Systems   Constitutional:  Negative for activity change, appetite change, fatigue and unexpected weight change.   HENT:  Negative for mouth dryness.    Eyes:  Negative for visual disturbance.   Respiratory:  Negative for apnea, chest tightness and shortness of breath.    Cardiovascular:  Negative for chest pain.   Gastrointestinal:  Negative for abdominal pain.   Musculoskeletal:  Negative for arthralgias and myalgias.   Integumentary:  Negative for rash.   Allergic/Immunologic: Negative for food allergies.   Psychiatric/Behavioral:  Negative for behavioral problems and sleep disturbance. The patient is not nervous/anxious.         Objective     Physical Exam  Vitals and nursing note reviewed.   Constitutional:       General: He is not in acute distress.  HENT:      Head: Normocephalic and atraumatic.      Mouth/Throat:      Pharynx: Oropharynx is clear.   Eyes:      General: No scleral icterus.     Pupils: Pupils are equal, round, and reactive to light.   Neck:      Comments: No TM  Cardiovascular:      Rate and Rhythm: Normal rate and regular rhythm.      Pulses: Normal pulses.      Heart sounds: " Normal heart sounds. No murmur heard.    No friction rub. No gallop.   Pulmonary:      Effort: Pulmonary effort is normal. No respiratory distress.      Breath sounds: Normal breath sounds. No wheezing, rhonchi or rales.   Abdominal:      General: Bowel sounds are normal. There is no distension.      Palpations: Abdomen is soft.      Tenderness: There is no abdominal tenderness.   Musculoskeletal:         General: No swelling.      Cervical back: Normal range of motion and neck supple. No tenderness.   Lymphadenopathy:      Cervical: No cervical adenopathy.   Skin:     General: Skin is warm.      Findings: No erythema or rash.   Neurological:      Mental Status: He is alert and oriented to person, place, and time.   Psychiatric:         Mood and Affect: Mood normal.         Behavior: Behavior normal.          Assessment and Plan       ICD-10-CM ICD-9-CM   1. Weight gain  R63.5 783.1   2. Obstructive sleep apnea on CPAP  G47.33 327.23    Z99.89 V46.8   3. Overweight (BMI 25.0-29.9)  E66.3 278.02   4. Autism  F84.0 299.00   5. Seasonal allergic rhinitis with post-nasal drip cough  J30.2 477.8   6. Seasonal allergic rhinitis  J30.2 477.8        Weight gain    Obstructive sleep apnea on CPAP  Comments:  chronic/stable    Overweight (BMI 25.0-29.9)  Continue lifestyle interventions    Autism  Comments:  chronic/stable; no behavorial changes    Seasonal allergic rhinitis with post-nasal drip cough  Comments:  chronic/stable; has nasal steroid; no acute worsening  Sneezing 3 weeks ago/needs refill--will send in  Orders:  -     fluticasone propionate (FLONASE) 50 mcg/actuation nasal spray; 2 sprays (100 mcg total) by Each Nostril route once daily.  Dispense: 32 g; Refill: 6    Seasonal allergic rhinitis  -     fluticasone propionate (FLONASE) 50 mcg/actuation nasal spray; 2 sprays (100 mcg total) by Each Nostril route once daily.  Dispense: 32 g; Refill: 6       Seen with brother as well-- pt/mom/brother given lifestyle  intervention counseling   Encouraged more bowling/walking  Pt/brother have worked to be more active  So will continue this  Not a med candidate at this time

## 2023-06-07 ENCOUNTER — OFFICE VISIT (OUTPATIENT)
Dept: INTERNAL MEDICINE | Facility: CLINIC | Age: 28
End: 2023-06-07
Payer: MEDICARE

## 2023-06-07 ENCOUNTER — LAB VISIT (OUTPATIENT)
Dept: LAB | Facility: HOSPITAL | Age: 28
End: 2023-06-07
Attending: FAMILY MEDICINE
Payer: MEDICARE

## 2023-06-07 VITALS
OXYGEN SATURATION: 97 % | WEIGHT: 183.44 LBS | SYSTOLIC BLOOD PRESSURE: 108 MMHG | HEIGHT: 67 IN | RESPIRATION RATE: 20 BRPM | DIASTOLIC BLOOD PRESSURE: 58 MMHG | TEMPERATURE: 97 F | HEART RATE: 67 BPM | BODY MASS INDEX: 28.79 KG/M2

## 2023-06-07 DIAGNOSIS — G47.33 OBSTRUCTIVE SLEEP APNEA ON CPAP: Chronic | ICD-10-CM

## 2023-06-07 DIAGNOSIS — Z13.1 SCREENING FOR DIABETES MELLITUS: ICD-10-CM

## 2023-06-07 DIAGNOSIS — E66.3 OVERWEIGHT (BMI 25.0-29.9): Chronic | ICD-10-CM

## 2023-06-07 DIAGNOSIS — E66.3 OVERWEIGHT (BMI 25.0-29.9): Primary | Chronic | ICD-10-CM

## 2023-06-07 DIAGNOSIS — J30.1 SEASONAL ALLERGIC RHINITIS DUE TO POLLEN: ICD-10-CM

## 2023-06-07 LAB
ALBUMIN SERPL BCP-MCNC: 4.3 G/DL (ref 3.5–5.2)
ALP SERPL-CCNC: 67 U/L (ref 55–135)
ALT SERPL W/O P-5'-P-CCNC: 45 U/L (ref 10–44)
ANION GAP SERPL CALC-SCNC: 8 MMOL/L (ref 8–16)
AST SERPL-CCNC: 32 U/L (ref 10–40)
BILIRUB SERPL-MCNC: 0.7 MG/DL (ref 0.1–1)
BUN SERPL-MCNC: 18 MG/DL (ref 6–20)
CALCIUM SERPL-MCNC: 10 MG/DL (ref 8.7–10.5)
CHLORIDE SERPL-SCNC: 103 MMOL/L (ref 95–110)
CHOLEST SERPL-MCNC: 213 MG/DL (ref 120–199)
CHOLEST/HDLC SERPL: 4.1 {RATIO} (ref 2–5)
CO2 SERPL-SCNC: 27 MMOL/L (ref 23–29)
CREAT SERPL-MCNC: 1.1 MG/DL (ref 0.5–1.4)
EST. GFR  (NO RACE VARIABLE): >60 ML/MIN/1.73 M^2
GLUCOSE SERPL-MCNC: 77 MG/DL (ref 70–110)
HDLC SERPL-MCNC: 52 MG/DL (ref 40–75)
HDLC SERPL: 24.4 % (ref 20–50)
LDLC SERPL CALC-MCNC: 143.6 MG/DL (ref 63–159)
NONHDLC SERPL-MCNC: 161 MG/DL
POTASSIUM SERPL-SCNC: 4.5 MMOL/L (ref 3.5–5.1)
PROT SERPL-MCNC: 7.8 G/DL (ref 6–8.4)
SODIUM SERPL-SCNC: 138 MMOL/L (ref 136–145)
TRIGL SERPL-MCNC: 87 MG/DL (ref 30–150)

## 2023-06-07 PROCEDURE — 99213 PR OFFICE/OUTPT VISIT, EST, LEVL III, 20-29 MIN: ICD-10-PCS | Mod: S$GLB,,, | Performed by: FAMILY MEDICINE

## 2023-06-07 PROCEDURE — 3074F SYST BP LT 130 MM HG: CPT | Mod: CPTII,S$GLB,, | Performed by: FAMILY MEDICINE

## 2023-06-07 PROCEDURE — 99213 OFFICE O/P EST LOW 20 MIN: CPT | Mod: S$GLB,,, | Performed by: FAMILY MEDICINE

## 2023-06-07 PROCEDURE — 1159F MED LIST DOCD IN RCRD: CPT | Mod: CPTII,S$GLB,, | Performed by: FAMILY MEDICINE

## 2023-06-07 PROCEDURE — 3008F BODY MASS INDEX DOCD: CPT | Mod: CPTII,S$GLB,, | Performed by: FAMILY MEDICINE

## 2023-06-07 PROCEDURE — 99999 PR PBB SHADOW E&M-EST. PATIENT-LVL III: CPT | Mod: PBBFAC,,, | Performed by: FAMILY MEDICINE

## 2023-06-07 PROCEDURE — 36415 COLL VENOUS BLD VENIPUNCTURE: CPT | Performed by: FAMILY MEDICINE

## 2023-06-07 PROCEDURE — 3078F PR MOST RECENT DIASTOLIC BLOOD PRESSURE < 80 MM HG: ICD-10-PCS | Mod: CPTII,S$GLB,, | Performed by: FAMILY MEDICINE

## 2023-06-07 PROCEDURE — 3078F DIAST BP <80 MM HG: CPT | Mod: CPTII,S$GLB,, | Performed by: FAMILY MEDICINE

## 2023-06-07 PROCEDURE — 1159F PR MEDICATION LIST DOCUMENTED IN MEDICAL RECORD: ICD-10-PCS | Mod: CPTII,S$GLB,, | Performed by: FAMILY MEDICINE

## 2023-06-07 PROCEDURE — 99999 PR PBB SHADOW E&M-EST. PATIENT-LVL III: ICD-10-PCS | Mod: PBBFAC,,, | Performed by: FAMILY MEDICINE

## 2023-06-07 PROCEDURE — 80061 LIPID PANEL: CPT | Performed by: FAMILY MEDICINE

## 2023-06-07 PROCEDURE — 3074F PR MOST RECENT SYSTOLIC BLOOD PRESSURE < 130 MM HG: ICD-10-PCS | Mod: CPTII,S$GLB,, | Performed by: FAMILY MEDICINE

## 2023-06-07 PROCEDURE — 80053 COMPREHEN METABOLIC PANEL: CPT | Performed by: FAMILY MEDICINE

## 2023-06-07 PROCEDURE — 3008F PR BODY MASS INDEX (BMI) DOCUMENTED: ICD-10-PCS | Mod: CPTII,S$GLB,, | Performed by: FAMILY MEDICINE

## 2023-06-07 RX ORDER — FLUTICASONE PROPIONATE 50 MCG
2 SPRAY, SUSPENSION (ML) NASAL DAILY
Qty: 32 G | Refills: 6 | Status: SHIPPED | OUTPATIENT
Start: 2023-06-07 | End: 2024-03-20 | Stop reason: SDUPTHER

## 2023-06-07 NOTE — PROGRESS NOTES
"OFFICE VISIT 6/7/23  9:40 AM CDT    Subjective   CHIEF COMPLAINT: Follow-up (6 m ) and FORMS (90 L )    Chronic conditions are represented as and appear to be compensated/controlled and stable. No new complaints or concerns reported. Form 90-L completed.      Review of Systems   Respiratory:  Negative for chest tightness and shortness of breath.    Endocrine: Negative for polydipsia and polyuria.       Objective   Vitals:    06/07/23 0942   BP: (!) 108/58   BP Location: Right arm   Patient Position: Sitting   BP Method: Large (Manual)   Pulse: 67   Resp: 20   Temp: 97 °F (36.1 °C)   SpO2: 97%   Weight: 83.2 kg (183 lb 6.8 oz)   Height: 5' 7" (1.702 m)   Physical Exam  Vitals reviewed.   Constitutional:       General: He is not in acute distress.     Appearance: Normal appearance. He is not ill-appearing, toxic-appearing or diaphoretic.   Cardiovascular:      Rate and Rhythm: Normal rate.   Pulmonary:      Effort: Pulmonary effort is normal.   Neurological:      Mental Status: He is alert and oriented to person, place, and time. Mental status is at baseline.   Psychiatric:         Mood and Affect: Mood normal.         Behavior: Behavior normal.         Judgment: Judgment normal.        Assessment and Plan   1. Overweight (BMI 25.0-29.9)  Assessment & Plan:  Wt Readings from Last 6 Encounters:   06/07/23 83.2 kg (183 lb 6.8 oz)   05/16/23 83.1 kg (183 lb 3.2 oz)   12/07/22 83.3 kg (183 lb 10.3 oz)   07/19/22 83 kg (182 lb 15.7 oz)   06/22/22 82.6 kg (182 lb 1.6 oz)   06/15/22 82.2 kg (181 lb 3.5 oz)     Estimated body mass index is 28.73 kg/m² as calculated from the following:    Height as of this encounter: 5' 7" (1.702 m).    Weight as of this encounter: 83.2 kg (183 lb 6.8 oz).      Orders:  -     Comprehensive Metabolic Panel; Future; Expected date: 06/07/2023  -     Lipid Panel; Future; Expected date: 06/07/2023    2. Seasonal allergic rhinitis due to pollen  -     fluticasone propionate (FLONASE) 50 " "mcg/actuation nasal spray; 2 sprays (100 mcg total) by Each Nostril route once daily.  Dispense: 32 g; Refill: 6    3. Obstructive sleep apnea on CPAP  Overview:  SLEEP PHYSICIAN: Tristan Alexander MD ([facility]) [phone]    Assessment & Plan:  He reports compliance with use of CPAP for treatment of obstructive sleep apnea, and he reports perceived therapeutic benefit.       4. Screening for diabetes mellitus    Unless noted herein, any chronic conditions are represented as and appear stable, and no other significant complaints or concerns were reported.    Follow up in about 9 months (around 3/7/2024) for re-evaluation.   Future Appointments   Date Time Provider Department [place]   7/18/2023  9:20 AM Rupali Gonzalez MD [facility]   3/7/2024  9:40 AM UBALDO Milan MD [facility]        Assessment and Plan    1. Overweight (BMI 25.0-29.9)  - Comprehensive Metabolic Panel; Future  - Lipid Panel; Future    2. Seasonal allergic rhinitis due to pollen  - fluticasone propionate (FLONASE) 50 mcg/actuation nasal spray; 2 sprays (100 mcg total) by Each Nostril route once daily.  Dispense: 32 g; Refill: 6    3. Obstructive sleep apnea on CPAP    4. Screening for diabetes mellitus    Orders Placed This Encounter    Comprehensive Metabolic Panel    Lipid Panel    fluticasone propionate (FLONASE) 50 mcg/actuation nasal spray        Documentation entered by me for this encounter may have been done in part using speech-recognition technology. Although I have made an effort to ensure accuracy, "sound like" errors may exist and should be interpreted in context.  "

## 2023-06-07 NOTE — ASSESSMENT & PLAN NOTE
"Wt Readings from Last 6 Encounters:   06/07/23 83.2 kg (183 lb 6.8 oz)   05/16/23 83.1 kg (183 lb 3.2 oz)   12/07/22 83.3 kg (183 lb 10.3 oz)   07/19/22 83 kg (182 lb 15.7 oz)   06/22/22 82.6 kg (182 lb 1.6 oz)   06/15/22 82.2 kg (181 lb 3.5 oz)     Estimated body mass index is 28.73 kg/m² as calculated from the following:    Height as of this encounter: 5' 7" (1.702 m).    Weight as of this encounter: 83.2 kg (183 lb 6.8 oz).    "

## 2023-06-27 ENCOUNTER — PATIENT MESSAGE (OUTPATIENT)
Dept: RESEARCH | Facility: HOSPITAL | Age: 28
End: 2023-06-27
Payer: MEDICARE

## 2023-07-11 ENCOUNTER — TELEPHONE (OUTPATIENT)
Dept: INTERNAL MEDICINE | Facility: CLINIC | Age: 28
End: 2023-07-11
Payer: MEDICARE

## 2023-07-11 NOTE — TELEPHONE ENCOUNTER
----- Message from Mady Bolaños sent at 7/11/2023  4:01 PM CDT -----  Contact: mahendra/ mother  Patients mother is calling to speak with the nurse regarding letter. Reports needing an updated letter with the patient diagnosis, stating he has autism etc for social security. Please give the patients mother a call back at .[phone]  Thanks latosha

## 2023-07-12 ENCOUNTER — PATIENT MESSAGE (OUTPATIENT)
Dept: INTERNAL MEDICINE | Facility: CLINIC | Age: 28
End: 2023-07-12
Payer: MEDICARE

## 2023-10-31 ENCOUNTER — OFFICE VISIT (OUTPATIENT)
Dept: OPHTHALMOLOGY | Facility: CLINIC | Age: 28
End: 2023-10-31
Payer: MEDICARE

## 2023-10-31 DIAGNOSIS — H52.221 REGULAR ASTIGMATISM OF RIGHT EYE: ICD-10-CM

## 2023-10-31 DIAGNOSIS — H53.8 BLURRED VISION, BILATERAL: Primary | ICD-10-CM

## 2023-10-31 DIAGNOSIS — H52.03 HYPEROPIA, BILATERAL: ICD-10-CM

## 2023-10-31 PROCEDURE — 99999 PR PBB SHADOW E&M-EST. PATIENT-LVL II: CPT | Mod: PBBFAC,,, | Performed by: OPTOMETRIST

## 2023-10-31 PROCEDURE — 92015 PR REFRACTION: ICD-10-PCS | Mod: S$GLB,,, | Performed by: OPTOMETRIST

## 2023-10-31 PROCEDURE — 92014 PR EYE EXAM, EST PATIENT,COMPREHESV: ICD-10-PCS | Mod: S$GLB,,, | Performed by: OPTOMETRIST

## 2023-10-31 PROCEDURE — 1160F PR REVIEW ALL MEDS BY PRESCRIBER/CLIN PHARMACIST DOCUMENTED: ICD-10-PCS | Mod: CPTII,S$GLB,, | Performed by: OPTOMETRIST

## 2023-10-31 PROCEDURE — 92015 DETERMINE REFRACTIVE STATE: CPT | Mod: S$GLB,,, | Performed by: OPTOMETRIST

## 2023-10-31 PROCEDURE — 92014 COMPRE OPH EXAM EST PT 1/>: CPT | Mod: S$GLB,,, | Performed by: OPTOMETRIST

## 2023-10-31 PROCEDURE — 1160F RVW MEDS BY RX/DR IN RCRD: CPT | Mod: CPTII,S$GLB,, | Performed by: OPTOMETRIST

## 2023-10-31 PROCEDURE — 1159F MED LIST DOCD IN RCRD: CPT | Mod: CPTII,S$GLB,, | Performed by: OPTOMETRIST

## 2023-10-31 PROCEDURE — 1159F PR MEDICATION LIST DOCUMENTED IN MEDICAL RECORD: ICD-10-PCS | Mod: CPTII,S$GLB,, | Performed by: OPTOMETRIST

## 2023-10-31 PROCEDURE — 99999 PR PBB SHADOW E&M-EST. PATIENT-LVL II: ICD-10-PCS | Mod: PBBFAC,,, | Performed by: OPTOMETRIST

## 2023-10-31 NOTE — PROGRESS NOTES
HPI     Annual Exam            Comments: RTC 1 yr for dilated eye exam  Vision changes since last eye exam?: no     Any eye pain today: no    Other ocular symptoms: no    Interested in contact lens fitting today? no                     Last edited by Ethel Oh on 10/31/2023  8:40 AM.            Assessment /Plan     For exam results, see Encounter Report.    Blurred vision, bilateral    Hyperopia, bilateral    Regular astigmatism of right eye    No correction is required at this time.   Discussed refraction with patient and/or patient's family.       RTC 1 yr for undilated eye exam or PRN if any problems.   Discussed above and answered questions.

## 2024-01-03 ENCOUNTER — TELEPHONE (OUTPATIENT)
Dept: INTERNAL MEDICINE | Facility: CLINIC | Age: 29
End: 2024-01-03
Payer: MEDICARE

## 2024-01-03 NOTE — TELEPHONE ENCOUNTER
Spoke with pt mother and his appointment was reschedule.    ----- Message from Diamone Speed sent at 1/3/2024  4:19 PM CST -----  Regarding: mother  Type: Patient Call Back       Who called: mother        What is the request in detail: mother wants to know if both of her son can be seen at the same time mother stated that one of her son I already being seen on feb 20 and wants to knoe if they can come together        Can the clinic reply by [facility]? Yes       Would the patient rather a call back or a response via [facility]? Call back       Best call back number: [phone]

## 2024-02-21 ENCOUNTER — TELEPHONE (OUTPATIENT)
Dept: INTERNAL MEDICINE | Facility: CLINIC | Age: 29
End: 2024-02-21
Payer: MEDICARE

## 2024-02-21 NOTE — TELEPHONE ENCOUNTER
----- Message from Nicol Payne sent at 2/21/2024  8:57 AM CST -----  Regarding: pt mother called  Name of Who is Calling: CHARLES CARBALLO [[id number]] Loretta ( mother)       What is the request in detail: Requesting the nurse to call back. She needs a letter stating the physical status of patient being autistic and complications with his medication contact number and fax. She acquires this before the 27th of feb and will pick it up along with the patients IQ testing and psych information.       Can the clinic reply by [facility]: No       What Number to Call Back if not in [facility]: Telephone Information:  [phone]

## 2024-02-28 ENCOUNTER — TELEPHONE (OUTPATIENT)
Dept: PRIMARY CARE CLINIC | Facility: CLINIC | Age: 29
End: 2024-02-28
Payer: MEDICARE

## 2024-02-28 NOTE — TELEPHONE ENCOUNTER
Spoke with pt mother informed her that I sent a message to Dr Milan.    ----- Message from Nicol Payne sent at 2/28/2024 12:37 PM CST -----  Regarding: pt called  Name of Who is Calling: CHARLES CARBALLO [[id number]] [place]( mother )       What is the request in detail: Requesting a letter for Ipad and cell phone through medicaid for communication skills. Mother is requesting a 2nd letter for the patient previous message was sent on 02/21  but she never heard from the office. Requesting to speak with office.       Can the clinic reply by [facility]: No       What Number to Call Back if not in [facility]: Telephone Information:          [phone]

## 2024-03-04 ENCOUNTER — TELEPHONE (OUTPATIENT)
Dept: PSYCHIATRY | Facility: CLINIC | Age: 29
End: 2024-03-04
Payer: MEDICARE

## 2024-03-04 ENCOUNTER — PATIENT MESSAGE (OUTPATIENT)
Dept: PSYCHIATRY | Facility: CLINIC | Age: 29
End: 2024-03-04
Payer: MEDICARE

## 2024-03-04 NOTE — LETTER
March 4, 2024    Rome Kelly  [address] [address]  [place] [address]             [facility]  [address] [place] [address]  Phone: [phone]  Fax: [phone] To Whom It May Concern,    Rome Kelly was evaluated by me on 6/22/2022. He was diagnosed with Autism Spectrum Disorder, level 2. His caregivers have been adherent to my recommendations.    Autism spectrum disorder is a lifelong neurological disorders which will change minimally over time. Autism leads to an increased difficulty with emotion regulation, significant difficulty with social communication, decreased motivation for tasks deemed uninteresting to the person, sensory sensitivities to noises or textures, and an increased likelihood of anxiety. Because of this, his ability to work a full-time job, or a job that requires even a moderate amount of training/learning, communication with others or sustained attention will be very difficult.     Please take this into consideration when evaluating his eligibility for any programs.    Please contact me with any questions or concerns.    Sincerely,    Manny Alvarado MD  [facility]

## 2024-03-04 NOTE — TELEPHONE ENCOUNTER
----- Message from Bushra Faith LPN sent at 3/4/2024  4:17 PM CST -----  Regarding: FW: pt mother called  Can you print this out and mail  ----- Message -----  From: Manny Alvarado MD  Sent: 3/4/2024   3:51 PM CST  To: Bushra Faith LPN  Subject: RE: pt mother called                             I signed a letter. Can you send it to the family?  ----- Message -----  From: Bushra Faith LPN  Sent: 2/21/2024  10:58 AM CST  To: Manny Alvarado MD  Subject: FW: pt mother called                             Mom stated just received a letter yesterday requesting this information. Let me know what you want me to type.  ----- Message -----  From: Nicol Payne  Sent: 2/21/2024   9:02 AM CST  To: [facility] Staff; #  Subject: pt mother called                                 Name of Who is Calling: CHARLES CARBALLO [[id number]] Loretta ( mother)       What is the request in detail: Requesting the nurse to call back. She needs a letter stating the physical status of patient being autistic and complications with his medication contact number and fax. She acquires this before the 27th of feb and will pick it up along with the patients IQ testing and psych information.       Can the clinic reply by [facility]: No       What Number to Call Back if not in [facility]: Telephone Information:  [phone]

## 2024-03-04 NOTE — TELEPHONE ENCOUNTER
----- Message from Bushra Faith LPN sent at 2/21/2024 10:58 AM CST -----  Regarding: FW: pt mother called  Mom stated just received a letter yesterday requesting this information. Let me know what you want me to type.  ----- Message -----  From: Nicol Payne  Sent: 2/21/2024   9:02 AM CST  To: [facility] Staff; #  Subject: pt mother called                                 Name of Who is Calling: CHARLES CARBALLO [[id number]] Loretta ( mother)       What is the request in detail: Requesting the nurse to call back. She needs a letter stating the physical status of patient being autistic and complications with his medication contact number and fax. She acquires this before the 27th of feb and will pick it up along with the patients IQ testing and psych information.       Can the clinic reply by [facility]: No       What Number to Call Back if not in [facility]: Telephone Information:  [phone]

## 2024-03-05 ENCOUNTER — TELEPHONE (OUTPATIENT)
Dept: INTERNAL MEDICINE | Facility: CLINIC | Age: 29
End: 2024-03-05
Payer: MEDICARE

## 2024-03-05 NOTE — TELEPHONE ENCOUNTER
"Letter written as requested, routed to my staff.    TO MY TEAM:    Please contact Rome's mother and relay message below.  --------------------------------------------------------------------------------   I sent you the letter you requested.    You should be able to view and print the document in your [facility] account under "Communications" > "Letters."    My staff can print a copy and mail it to you, if you wish.    Let me know if I can do anything else for you.  "

## 2024-03-05 NOTE — TELEPHONE ENCOUNTER
Spoke with mother. Request a letter stating that provider is PCP, patients diagnosis and medications. States patient is at risk for losing QMB status with Medicaid. Please advise.//ana maría    FYI: mother will be here at [facility] on 3/12/2024 for an appointment. States she can  letter at that time.

## 2024-03-05 NOTE — TELEPHONE ENCOUNTER
----- Message from Melania Guillaume sent at 3/4/2024  4:40 PM CST -----  Contact: Emely/mother  Pt mother is calling in regards to getting a call back to check the status of letter with pt PCP, diagnoses, medication and IQ testing. Please call back at [phone]                        Thanks  KT

## 2024-03-05 NOTE — LETTER
2024        TO WHOM IT MAY CONCERN:       RE: Rome Kelly,  1995    Rome Kelly is under my care. I am Rome's primary care physician. This letter is being  written at the request of his mother.    Rome has current problem list that includes Autism; Obstructive sleep apnea on CPAP; Overweight (BMI 25.0-29.9); and Seasonal allergic rhinitis due to pollen.    He has a past medical history of Autism, and Obstructive sleep apnea.    Please extend to Rome all due courtesy, consideration, and accommodation.    Sincerely,     IMTIAZ Milan MD, FAAFP

## 2024-03-20 ENCOUNTER — TELEPHONE (OUTPATIENT)
Dept: SLEEP MEDICINE | Facility: CLINIC | Age: 29
End: 2024-03-20
Payer: MEDICARE

## 2024-03-20 ENCOUNTER — OFFICE VISIT (OUTPATIENT)
Dept: INTERNAL MEDICINE | Facility: CLINIC | Age: 29
End: 2024-03-20
Payer: MEDICARE

## 2024-03-20 VITALS
TEMPERATURE: 96 F | HEART RATE: 75 BPM | WEIGHT: 186.31 LBS | SYSTOLIC BLOOD PRESSURE: 110 MMHG | DIASTOLIC BLOOD PRESSURE: 60 MMHG | OXYGEN SATURATION: 97 % | RESPIRATION RATE: 18 BRPM | BODY MASS INDEX: 29.24 KG/M2 | HEIGHT: 67 IN

## 2024-03-20 DIAGNOSIS — E66.3 OVERWEIGHT (BMI 25.0-29.9): Chronic | ICD-10-CM

## 2024-03-20 DIAGNOSIS — G47.33 OBSTRUCTIVE SLEEP APNEA ON CPAP: Chronic | ICD-10-CM

## 2024-03-20 DIAGNOSIS — Z13.6 ENCOUNTER FOR LIPID SCREENING FOR CARDIOVASCULAR DISEASE: ICD-10-CM

## 2024-03-20 DIAGNOSIS — Z13.220 ENCOUNTER FOR LIPID SCREENING FOR CARDIOVASCULAR DISEASE: ICD-10-CM

## 2024-03-20 DIAGNOSIS — E78.2 MODERATE MIXED HYPERLIPIDEMIA NOT REQUIRING STATIN THERAPY: ICD-10-CM

## 2024-03-20 DIAGNOSIS — F84.0 AUTISM: Chronic | ICD-10-CM

## 2024-03-20 DIAGNOSIS — R74.01 ELEVATED ALT MEASUREMENT: ICD-10-CM

## 2024-03-20 DIAGNOSIS — J30.1 SEASONAL ALLERGIC RHINITIS DUE TO POLLEN: Primary | Chronic | ICD-10-CM

## 2024-03-20 PROCEDURE — 1159F MED LIST DOCD IN RCRD: CPT | Mod: CPTII,S$GLB,, | Performed by: FAMILY MEDICINE

## 2024-03-20 PROCEDURE — 3008F BODY MASS INDEX DOCD: CPT | Mod: CPTII,S$GLB,, | Performed by: FAMILY MEDICINE

## 2024-03-20 PROCEDURE — 3078F DIAST BP <80 MM HG: CPT | Mod: CPTII,S$GLB,, | Performed by: FAMILY MEDICINE

## 2024-03-20 PROCEDURE — 3074F SYST BP LT 130 MM HG: CPT | Mod: CPTII,S$GLB,, | Performed by: FAMILY MEDICINE

## 2024-03-20 PROCEDURE — 1160F RVW MEDS BY RX/DR IN RCRD: CPT | Mod: CPTII,S$GLB,, | Performed by: FAMILY MEDICINE

## 2024-03-20 PROCEDURE — 99213 OFFICE O/P EST LOW 20 MIN: CPT | Mod: S$GLB,,, | Performed by: FAMILY MEDICINE

## 2024-03-20 PROCEDURE — 99999 PR PBB SHADOW E&M-EST. PATIENT-LVL III: CPT | Mod: PBBFAC,,, | Performed by: FAMILY MEDICINE

## 2024-03-20 RX ORDER — FLUTICASONE PROPIONATE 50 MCG
2 SPRAY, SUSPENSION (ML) NASAL DAILY
Qty: 32 G | Refills: 6 | Status: SHIPPED | OUTPATIENT
Start: 2024-03-20

## 2024-03-20 NOTE — PROGRESS NOTES
"OFFICE VISIT 3/20/24  8:20 AM CDT    CHIEF COMPLAINT: Check-up    HPI  1. Seasonal allergic rhinitis due to pollen  Assessment & Plan:  This is a chronic problem that appears compensated/controlled and stable on Flonase.    Orders:  -     fluticasone propionate (FLONASE) 50 mcg/actuation nasal spray; 2 sprays (100 mcg total) by Each Nostril route once daily.  Dispense: 32 g; Refill: 6    2. Overweight (BMI 25.0-29.9)  Assessment & Plan:  Wt Readings from Last 6 Encounters:   03/20/24 84.5 kg (186 lb 4.6 oz)   06/07/23 83.2 kg (183 lb 6.8 oz)   05/16/23 83.1 kg (183 lb 3.2 oz)   12/07/22 83.3 kg (183 lb 10.3 oz)   07/19/22 83 kg (182 lb 15.7 oz)   06/22/22 82.6 kg (182 lb 1.6 oz)     Estimated body mass index is 29.18 kg/m² as calculated from the following:    Height as of this encounter: 5' 7" (1.702 m).    Weight as of this encounter: 84.5 kg (186 lb 4.6 oz).    Therapeutic lifestyle changes encouraged.      3. Obstructive sleep apnea on CPAP  Assessment & Plan:  Diagnosed with FREDERICK by PSG as pre-teen. No documentation of initial testing available. Prior Tristan Alexander MD last seen 2017. Consistently using a family member's CPAP. Needs re-testing to confirm Dx to justify new equipment and supplies.  Neck circumference: Neck circumference greater than 40 cm? ANSWER: YES (Neck circumference = 43 cm, Mallampati class 4 oropharynx.    Orders:  -     Home Sleep Study; Future    4. Autism  Assessment & Plan:  This is a chronic problem that appears compensated/controlled and stable.      5. Elevated ALT measurement  -     Comprehensive Metabolic Panel; Future; Expected date: 03/20/2024    6. Moderate mixed hyperlipidemia not requiring statin therapy  -     Lipid Panel; Future; Expected date: 03/20/2024    7. Encounter for lipid screening for cardiovascular disease  -     Lipid Panel; Future; Expected date: 03/20/2024    Unless noted herein, any chronic conditions are represented as and appear stable, and no other " "significant complaints or concerns were reported.    Follow up in about 1 year (around 3/20/2025).   No future appointments.      Vitals:    03/20/24 0831   BP: 110/60   BP Location: Right arm   Patient Position: Sitting   BP Method: Large (Manual)   Pulse: 75   Resp: 18   Temp: 96.4 °F (35.8 °C)   SpO2: 97%   Weight: 84.5 kg (186 lb 4.6 oz)   Height: 5' 7" (1.702 m)   Physical Exam  Vitals reviewed.   Constitutional:       General: He is not in acute distress.     Appearance: Normal appearance. He is not ill-appearing or diaphoretic.   Cardiovascular:      Rate and Rhythm: Normal rate and regular rhythm.   Pulmonary:      Effort: Pulmonary effort is normal.      Breath sounds: Normal breath sounds.   Skin:     General: Skin is warm and dry.   Neurological:      Mental Status: He is alert and oriented to person, place, and time. Mental status is at baseline.   Psychiatric:         Mood and Affect: Mood normal.       Documentation entered by me for this encounter may have been done in part using speech-recognition technology. Although I have made an effort to ensure accuracy, "sound like" errors may exist and should be interpreted in context.  "

## 2024-03-20 NOTE — ASSESSMENT & PLAN NOTE
"Wt Readings from Last 6 Encounters:   03/20/24 84.5 kg (186 lb 4.6 oz)   06/07/23 83.2 kg (183 lb 6.8 oz)   05/16/23 83.1 kg (183 lb 3.2 oz)   12/07/22 83.3 kg (183 lb 10.3 oz)   07/19/22 83 kg (182 lb 15.7 oz)   06/22/22 82.6 kg (182 lb 1.6 oz)     Estimated body mass index is 29.18 kg/m² as calculated from the following:    Height as of this encounter: 5' 7" (1.702 m).    Weight as of this encounter: 84.5 kg (186 lb 4.6 oz).    Therapeutic lifestyle changes encouraged.  "

## 2024-03-20 NOTE — ASSESSMENT & PLAN NOTE
Diagnosed with FREDERICK by PSG as pre-teen. No documentation of initial testing available. Prior Tristan Alexander MD last seen 2017. Consistently using a family member's CPAP. Needs re-testing to confirm Dx to justify new equipment and supplies.  Neck circumference: Neck circumference greater than 40 cm? ANSWER: YES (Neck circumference = 43 cm, Mallampati class 4 oropharynx.

## 2024-03-20 NOTE — TELEPHONE ENCOUNTER
----- Message from Angel Constantino sent at 3/20/2024 11:53 AM CDT -----  Review Chart, HSAT

## 2024-03-21 ENCOUNTER — TELEPHONE (OUTPATIENT)
Dept: INTERNAL MEDICINE | Facility: CLINIC | Age: 29
End: 2024-03-21
Payer: MEDICARE

## 2024-03-21 ENCOUNTER — TELEPHONE (OUTPATIENT)
Dept: PSYCHIATRY | Facility: CLINIC | Age: 29
End: 2024-03-21
Payer: MEDICARE

## 2024-03-21 NOTE — TELEPHONE ENCOUNTER
----- Message from Deepthi Carballo sent at 3/21/2024  2:26 PM CDT -----  Contact: Emely/mother  Pt mother is calling in regards to the pt 90L is due August 2024.please call back at .[phone]               Thanks  BETSY

## 2024-03-21 NOTE — TELEPHONE ENCOUNTER
----- Message from Pat Bronson sent at 3/21/2024  1:55 PM CDT -----  Contact: Mother, Emely. [phone]  Calling because she is requesting a letter stating the patient's mental history. Please advise. Thanks.

## 2024-04-08 ENCOUNTER — HOSPITAL ENCOUNTER (OUTPATIENT)
Dept: SLEEP MEDICINE | Facility: HOSPITAL | Age: 29
Discharge: HOME | End: 2024-04-08
Attending: FAMILY MEDICINE
Payer: MEDICARE

## 2024-04-08 DIAGNOSIS — G47.33 OBSTRUCTIVE SLEEP APNEA ON CPAP: Chronic | ICD-10-CM

## 2024-04-08 PROCEDURE — 95806 SLEEP STUDY UNATT&RESP EFFT: CPT | Performed by: INTERNAL MEDICINE

## 2024-04-08 PROCEDURE — 95806 SLEEP STUDY UNATT&RESP EFFT: CPT | Mod: 26,,, | Performed by: INTERNAL MEDICINE

## 2024-04-09 ENCOUNTER — PATIENT MESSAGE (OUTPATIENT)
Dept: PULMONOLOGY | Facility: CLINIC | Age: 29
End: 2024-04-09
Payer: MEDICARE

## 2024-04-09 NOTE — PROCEDURES
PHYSICIAN INTERPRETATION AND COMMENTS: Findings are consistent with moderate, positional obstructive sleep  apnea(FREDERICK). There is insufficient non-supine study time to assess the severity of non-positional obstructive sleep apnea  (FREDERICK). please refer to sleep Disorder Clinic for management  CLINICAL HISTORY: 28 year old male presented with: 16 inch neck, BMI of 27.3, an [place] sleepiness score of 0, and  history of a previous diagnosis of FREDERICK. Based on the clinical history, the patient has a high pre-test probability of having  Mild FREDERICK.  SLEEP STUDY FINDINGS: Patient underwent a 1 night Home Sleep Test and by behavioral criteria, slept for approximately  6.34 hours, with a sleep latency of 6 minutes and a sleep efficiency of 95%. Mild sleep disordered breathing (AHI=9) is  noted based on a 4% hypopnea desaturation criteria, entirely in the supine position (9 events/hour). The patient slept  supine 99.5% of the night based on valid recording time of 6.47 hours. When considering more subtle measures of sleep  disordered breathing, the overall respiratory disturbance index is moderate(RDI=23) based on a 1% hypopnea desaturation  criteria with confirmation by surrogate arousal indicators. The apneas/hypopneas are accompanied by minimal oxygen  desaturation (percent time below 90% SpO2: 0%, Min SpO2: 84.5%). The average desaturation across all sleep disordered  breathing events is 2.3%. Snoring occurs for 1.1% (30 dB) of the study. The mean pulse rate is 68 BPM, with very frequent  pulse rate variability (85 events with >= 6 BPM increase/decrease per hour).  TREATMENT CONSIDERATIONS: Consider nasal continuous positive airway pressure (CPAP/AutoPAP) as the initial  treatment choice based on the RDI severity and co-morbidities. A mandibular advancement splint (MAS) or referral to an  ENT surgeon for modification to the airway should be considered to reduce the potential contribution of FREDERICK on existing  diseases if the  "patient prefers an alternative therapy or the CPAP trial is unsuccessful. Based on the FREDERICK Supine data in the  study, a Mandibular Advancement Splint (MAS) will likely provide treatment benefit independent of FREDERICK severity.  DISEASE MANAGEMENT CONSIDERATIONS: None    Dear UBALDO Milan MD  [id number] [address] [facility]  [place] [id number]/UBALDO Milan MD         The sleep study that you ordered is complete.  You have ordered sleep LAB services to perform the sleep study for Rome Kelly .      Please find Sleep Study result in  the "Media tab" of Chart Review menu.        You can look  for the report in the  Media by the document type "Sleep Study Documents". Alphabetizing  "Document type" column helps to find the SLEEP STUDY report  Faster.       As the ordering provider, you are responsible for reviewing the results and implementing a treatment plan with your patient.    If you need a Sleep Medicine provider to explain the sleep study findings and arrange treatment for the patient, please refer patient for consultation to our Sleep Clinic via [facility] with Ambulatory Consult Sleep.     To do that please place an order for an  "Ambulatory Consult Sleep" -  order , it will go to our clinic work queue for our staff  to contact the patient for an appointment.      For any questions, please contact our sleep lab  staff at [phone] to talk to clinical staff          Jh Cardona MD      "

## 2024-04-10 ENCOUNTER — PATIENT MESSAGE (OUTPATIENT)
Dept: INTERNAL MEDICINE | Facility: CLINIC | Age: 29
End: 2024-04-10
Payer: MEDICARE

## 2024-04-10 DIAGNOSIS — G47.33 OBSTRUCTIVE SLEEP APNEA ON CPAP: Primary | Chronic | ICD-10-CM

## 2024-04-18 ENCOUNTER — PATIENT MESSAGE (OUTPATIENT)
Dept: INTERNAL MEDICINE | Facility: CLINIC | Age: 29
End: 2024-04-18
Payer: MEDICARE

## 2024-04-29 ENCOUNTER — PATIENT MESSAGE (OUTPATIENT)
Dept: PULMONOLOGY | Facility: CLINIC | Age: 29
End: 2024-04-29
Payer: MEDICARE

## 2024-07-09 ENCOUNTER — PATIENT MESSAGE (OUTPATIENT)
Dept: RESEARCH | Facility: HOSPITAL | Age: 29
End: 2024-07-09
Payer: MEDICARE

## 2024-08-26 ENCOUNTER — TELEPHONE (OUTPATIENT)
Dept: INTERNAL MEDICINE | Facility: CLINIC | Age: 29
End: 2024-08-26
Payer: MEDICARE

## 2024-09-05 ENCOUNTER — TELEPHONE (OUTPATIENT)
Dept: INTERNAL MEDICINE | Facility: CLINIC | Age: 29
End: 2024-09-05
Payer: MEDICARE

## 2024-09-24 ENCOUNTER — TELEPHONE (OUTPATIENT)
Dept: INTERNAL MEDICINE | Facility: CLINIC | Age: 29
End: 2024-09-24
Payer: MEDICARE

## 2024-09-24 NOTE — TELEPHONE ENCOUNTER
----- Message from Cristian Ferreira sent at 9/24/2024  8:59 AM CDT -----  Contact: Emely/mom  Emely/mom would like a call back at [phone] in regards to needing to speak with nurse about the [facility] sending over a 90L form for the patient about 2 weeks ago that they need filled out. They contacted mom on yesterday letting her know that it was sent and they haven't gotten a response and is needing it before 9/30/24.  Thanks   Am

## 2024-09-24 NOTE — TELEPHONE ENCOUNTER
Pt call was returned and his mother was informed that I will check to see if a faxed was received of a 90L form she verbalized understanding.

## 2024-09-25 ENCOUNTER — TELEPHONE (OUTPATIENT)
Dept: INTERNAL MEDICINE | Facility: CLINIC | Age: 29
End: 2024-09-25
Payer: MEDICARE

## 2024-09-25 NOTE — TELEPHONE ENCOUNTER
----- Message from Gerard Noland sent at 9/25/2024  1:01 PM CDT -----  Contact: love/mom  [employer] is requesting call back in regards to a 90-L she needs for minna before 30th. Please give her a call back at [phone]

## 2024-09-25 NOTE — TELEPHONE ENCOUNTER
Spoke with pt mother; she was calling in regards to 90-L form; MA advised her form wasn't received & provided main fax # for document to be resent from [facility]  ([facility]JOLIE

## 2024-10-03 ENCOUNTER — TELEPHONE (OUTPATIENT)
Dept: INTERNAL MEDICINE | Facility: CLINIC | Age: 29
End: 2024-10-03
Payer: MEDICARE

## 2024-10-03 NOTE — TELEPHONE ENCOUNTER
----- Message from Breanne sent at 10/3/2024 10:22 AM CDT -----  Contact: Genesis [phone]  Genesis Sutherland,  calling in regards to a 90L form that was faxed over on 09/19/2024.  She is calling to see if it was received.  Please fill out and fax back to [phone].

## 2024-12-13 ENCOUNTER — TELEPHONE (OUTPATIENT)
Dept: OTOLARYNGOLOGY | Facility: CLINIC | Age: 29
End: 2024-12-13
Payer: MEDICARE

## 2024-12-13 NOTE — TELEPHONE ENCOUNTER
----- Message from Ginger Santos sent at 12/12/2024  4:09 PM CST -----  Regarding: Pls call, he needs referral from PCP prior to scheduling  Contact: [phone]  TY  ----- Message -----  From: Meg Cuevas  Sent: 12/12/2024   4:05 PM CST  To: [facility] Clinical Staff    Type:  Sooner Apoointment Request    Caller is requesting a sooner appointment.  Caller declined first available appointment listed below.  Caller will not accept being placed on the waitlist and is requesting a message be sent to doctor.  Name of Caller:mom  When is the first available appointment?next availability  Symptoms:need an referral to see an audio Dr   Would the patient rather a call back or a response via [facility]? Call back  Best Call Back Number: [phone]  Additional Information: [id number]

## 2024-12-13 NOTE — TELEPHONE ENCOUNTER
LM advising pt he will need to get his PCP to send over a referral and once received I will give him a call. Also provided him with our fax number.

## 2025-02-20 NOTE — TELEPHONE ENCOUNTER
Informed mother that letters stating pts will no longer be under Dr. Ramos's care and will now be under Dr. Milan's care has been completed and can be picked up. Mother stated that she will pick them up when she comes on April 13th. I informed her I would leave them at the  on the 5th floor. //BJ   stated

## 2025-02-27 ENCOUNTER — TELEPHONE (OUTPATIENT)
Dept: INTERNAL MEDICINE | Facility: CLINIC | Age: 30
End: 2025-02-27
Payer: MEDICARE

## 2025-02-27 NOTE — TELEPHONE ENCOUNTER
Spoke with pt mother and got him scheduled for an annual and question about who is replacing his psych Doctor

## 2025-02-27 NOTE — TELEPHONE ENCOUNTER
----- Message from Gisell sent at 2/27/2025 11:11 AM CST -----  Contact: Patient/mom  .TYPE: Patient Call BackWho called:patient mom What is the request in detail:  patient mom in call concerning who will be taken  Where place in office now that he no longer with [facility] please give call back Can the clinic reply by [facility]?   Would the patient rather a call back or a response via [facility] call back number:.[phone] (home) [phone] (work)

## 2025-04-03 ENCOUNTER — OFFICE VISIT (OUTPATIENT)
Dept: INTERNAL MEDICINE | Facility: CLINIC | Age: 30
End: 2025-04-03
Payer: MEDICARE

## 2025-04-03 VITALS
HEIGHT: 67 IN | RESPIRATION RATE: 18 BRPM | TEMPERATURE: 98 F | OXYGEN SATURATION: 96 % | WEIGHT: 194.69 LBS | DIASTOLIC BLOOD PRESSURE: 66 MMHG | HEART RATE: 93 BPM | BODY MASS INDEX: 30.56 KG/M2 | SYSTOLIC BLOOD PRESSURE: 110 MMHG

## 2025-04-03 DIAGNOSIS — J30.1 SEASONAL ALLERGIC RHINITIS DUE TO POLLEN: Chronic | ICD-10-CM

## 2025-04-03 DIAGNOSIS — F84.0 AUTISM: Chronic | ICD-10-CM

## 2025-04-03 DIAGNOSIS — Z13.1 SCREENING FOR DIABETES MELLITUS: ICD-10-CM

## 2025-04-03 DIAGNOSIS — Z86.39 HISTORY OF METABOLIC DISORDER: ICD-10-CM

## 2025-04-03 DIAGNOSIS — G47.33 OBSTRUCTIVE SLEEP APNEA ON CPAP: Chronic | ICD-10-CM

## 2025-04-03 DIAGNOSIS — R41.3 MEMORY CHANGES: Primary | ICD-10-CM

## 2025-04-03 DIAGNOSIS — R74.01 ELEVATED ALT MEASUREMENT: ICD-10-CM

## 2025-04-03 DIAGNOSIS — R41.3 OTHER AMNESIA: ICD-10-CM

## 2025-04-03 DIAGNOSIS — E66.09 CLASS 1 OBESITY DUE TO EXCESS CALORIES WITHOUT SERIOUS COMORBIDITY WITH BODY MASS INDEX (BMI) OF 30.0 TO 30.9 IN ADULT: Chronic | ICD-10-CM

## 2025-04-03 DIAGNOSIS — E78.2 MODERATE MIXED HYPERLIPIDEMIA NOT REQUIRING STATIN THERAPY: Chronic | ICD-10-CM

## 2025-04-03 DIAGNOSIS — R73.03 PREDIABETES: ICD-10-CM

## 2025-04-03 DIAGNOSIS — E66.811 CLASS 1 OBESITY DUE TO EXCESS CALORIES WITHOUT SERIOUS COMORBIDITY WITH BODY MASS INDEX (BMI) OF 30.0 TO 30.9 IN ADULT: Chronic | ICD-10-CM

## 2025-04-03 LAB
ALBUMIN SERPL BCP-MCNC: 4.1 G/DL (ref 3.5–5.2)
ALP SERPL-CCNC: 76 UNIT/L (ref 40–150)
ALT SERPL W/O P-5'-P-CCNC: 67 UNIT/L (ref 10–44)
ANION GAP (OHS): 12 MMOL/L (ref 8–16)
AST SERPL-CCNC: 50 UNIT/L (ref 11–45)
BILIRUB SERPL-MCNC: 0.5 MG/DL (ref 0.1–1)
BUN SERPL-MCNC: 13 MG/DL (ref 6–20)
CALCIUM SERPL-MCNC: 9.4 MG/DL (ref 8.7–10.5)
CHLORIDE SERPL-SCNC: 106 MMOL/L (ref 95–110)
CHOLEST SERPL-MCNC: 199 MG/DL (ref 120–199)
CHOLEST/HDLC SERPL: 4.1 {RATIO} (ref 2–5)
CO2 SERPL-SCNC: 23 MMOL/L (ref 23–29)
CREAT SERPL-MCNC: 1 MG/DL (ref 0.5–1.4)
EAG (OHS): 120 MG/DL (ref 68–131)
GFR SERPLBLD CREATININE-BSD FMLA CKD-EPI: >60 ML/MIN/1.73/M2
GLUCOSE SERPL-MCNC: 84 MG/DL (ref 70–110)
HBA1C MFR BLD: 5.8 % (ref 4–5.6)
HDLC SERPL-MCNC: 49 MG/DL (ref 40–75)
HDLC SERPL: 24.6 % (ref 20–50)
LDLC SERPL CALC-MCNC: 124 MG/DL (ref 63–159)
NONHDLC SERPL-MCNC: 150 MG/DL
POTASSIUM SERPL-SCNC: 4 MMOL/L (ref 3.5–5.1)
PROT SERPL-MCNC: 7.9 GM/DL (ref 6–8.4)
SODIUM SERPL-SCNC: 141 MMOL/L (ref 136–145)
TRIGL SERPL-MCNC: 130 MG/DL (ref 30–150)
TSH SERPL-ACNC: 1.62 UIU/ML (ref 0.4–4)

## 2025-04-03 PROCEDURE — 3078F DIAST BP <80 MM HG: CPT | Mod: CPTII,S$GLB,, | Performed by: FAMILY MEDICINE

## 2025-04-03 PROCEDURE — 80053 COMPREHEN METABOLIC PANEL: CPT | Performed by: FAMILY MEDICINE

## 2025-04-03 PROCEDURE — 84443 ASSAY THYROID STIM HORMONE: CPT | Performed by: FAMILY MEDICINE

## 2025-04-03 PROCEDURE — 36415 COLL VENOUS BLD VENIPUNCTURE: CPT | Performed by: FAMILY MEDICINE

## 2025-04-03 PROCEDURE — 80061 LIPID PANEL: CPT | Performed by: FAMILY MEDICINE

## 2025-04-03 PROCEDURE — 3044F HG A1C LEVEL LT 7.0%: CPT | Mod: CPTII,S$GLB,, | Performed by: FAMILY MEDICINE

## 2025-04-03 PROCEDURE — G2211 COMPLEX E/M VISIT ADD ON: HCPCS | Mod: S$GLB,,, | Performed by: FAMILY MEDICINE

## 2025-04-03 PROCEDURE — 3008F BODY MASS INDEX DOCD: CPT | Mod: CPTII,S$GLB,, | Performed by: FAMILY MEDICINE

## 2025-04-03 PROCEDURE — 99214 OFFICE O/P EST MOD 30 MIN: CPT | Mod: S$GLB,,, | Performed by: FAMILY MEDICINE

## 2025-04-03 PROCEDURE — 3074F SYST BP LT 130 MM HG: CPT | Mod: CPTII,S$GLB,, | Performed by: FAMILY MEDICINE

## 2025-04-03 PROCEDURE — 99999 PR PBB SHADOW E&M-EST. PATIENT-LVL IV: CPT | Mod: PBBFAC,,, | Performed by: FAMILY MEDICINE

## 2025-04-03 PROCEDURE — 1159F MED LIST DOCD IN RCRD: CPT | Mod: CPTII,S$GLB,, | Performed by: FAMILY MEDICINE

## 2025-04-03 PROCEDURE — 83036 HEMOGLOBIN GLYCOSYLATED A1C: CPT | Performed by: FAMILY MEDICINE

## 2025-04-03 PROCEDURE — 1160F RVW MEDS BY RX/DR IN RCRD: CPT | Mod: CPTII,S$GLB,, | Performed by: FAMILY MEDICINE

## 2025-04-03 RX ORDER — LORATADINE 10 MG/1
10 TABLET ORAL DAILY PRN
Qty: 90 TABLET | Refills: 3 | Status: SHIPPED | OUTPATIENT
Start: 2025-04-03

## 2025-04-03 RX ORDER — FLUTICASONE PROPIONATE 50 MCG
2 SPRAY, SUSPENSION (ML) NASAL DAILY
Qty: 16 G | Refills: 11 | Status: SHIPPED | OUTPATIENT
Start: 2025-04-03

## 2025-04-03 NOTE — ASSESSMENT & PLAN NOTE
"Wt Readings from Last 6 Encounters:   04/03/25 88.3 kg (194 lb 10.7 oz)   03/20/24 84.5 kg (186 lb 4.6 oz)   06/07/23 83.2 kg (183 lb 6.8 oz)   05/16/23 83.1 kg (183 lb 3.2 oz)   12/07/22 83.3 kg (183 lb 10.3 oz)   07/19/22 83 kg (182 lb 15.7 oz)     Estimated body mass index is 30.49 kg/m² as calculated from the following:    Height as of this encounter: 5' 7" (1.702 m).    Weight as of this encounter: 88.3 kg (194 lb 10.7 oz).    "

## 2025-04-03 NOTE — PROGRESS NOTES
"  TO DO***  Refer to new psych  E-consult Pharmacy RE: GLP-1a  ***       OFFICE VISIT 4/3/25  8:40 AM CDT    CHIEF COMPLAINT: Annual Exam    HPI  1. Memory changes  -     TSH    2. Other amnesia  -     MRI Brain With Contrast; Future; Expected date: 04/03/2025    3. Seasonal allergic rhinitis due to pollen  -     fluticasone propionate (FLONASE) 50 mcg/actuation nasal spray; 2 sprays (100 mcg total) by Each Nostril route once daily.  Dispense: 16 g; Refill: 11  -     loratadine (CLARITIN) 10 mg tablet; Take 1 tablet (10 mg total) by mouth daily as needed for Allergies.  Dispense: 90 tablet; Refill: 3    4. Autism    5. Class 1 obesity due to excess calories without serious comorbidity with body mass index (BMI) of 30.0 to 30.9 in adult  Assessment & Plan:  Wt Readings from Last 6 Encounters:   04/03/25 88.3 kg (194 lb 10.7 oz)   03/20/24 84.5 kg (186 lb 4.6 oz)   06/07/23 83.2 kg (183 lb 6.8 oz)   05/16/23 83.1 kg (183 lb 3.2 oz)   12/07/22 83.3 kg (183 lb 10.3 oz)   07/19/22 83 kg (182 lb 15.7 oz)     Estimated body mass index is 30.49 kg/m² as calculated from the following:    Height as of this encounter: 5' 7" (1.702 m).    Weight as of this encounter: 88.3 kg (194 lb 10.7 oz).      Orders:  -     Comprehensive Metabolic Panel  -     Hemoglobin A1C  -     Lipid Panel  -     TSH    6. Obstructive sleep apnea on CPAP    7. Elevated ALT measurement  -     Comprehensive Metabolic Panel    8. Moderate mixed hyperlipidemia not requiring statin therapy  -     Comprehensive Metabolic Panel  -     Lipid Panel    9. Screening for diabetes mellitus  -     Hemoglobin A1C    10. History of metabolic disorder  -     Hemoglobin A1C         {Complex Visit?:78181::"Unless specified otherwise, chronic conditions are represented as and appear to be compensated/controlled and stable.","Today's visit involved the intricate management of episodic problem(s) and the ongoing care for the patient's serious or complex condition(s) listed " "above, reflecting the inherent complexity of providing longitudinal, comprehensive evaluation and management as the central hub for the patient's primary care services.","Any education and instructions provided to the patient via Patient Portal Message today are incorporated herein by reference."}    {LM ROS Optional (Optional):99364::"Except as noted herein, ROS is otherwise negative."}    Vitals:    04/03/25 0830   BP: 110/66   BP Location: Left arm   Patient Position: Sitting   Pulse: 93   Resp: 18   Temp: 98.3 °F (36.8 °C)   TempSrc: Oral   SpO2: 96%   Weight: 88.3 kg (194 lb 10.7 oz)   Height: 5' 7" (1.702 m)   Physical Exam***  Documentation entered by me for this encounter may have been done in part using speech-recognition technology. Although I have made an effort to ensure accuracy, "sound like" errors may exist and should be interpreted in context.    { Wrap-Up Instructions (Optional):35608}    Follow up in about 12 weeks (around 6/26/2025).***No future appointments.  "

## 2025-04-05 ENCOUNTER — RESULTS FOLLOW-UP (OUTPATIENT)
Dept: INTERNAL MEDICINE | Facility: CLINIC | Age: 30
End: 2025-04-05

## 2025-04-05 PROBLEM — R73.03 PREDIABETES: Chronic | Status: ACTIVE | Noted: 2025-04-05

## 2025-04-05 PROBLEM — R73.03 PREDIABETES: Status: ACTIVE | Noted: 2025-04-05

## 2025-04-14 ENCOUNTER — PATIENT MESSAGE (OUTPATIENT)
Dept: PSYCHIATRY | Facility: CLINIC | Age: 30
End: 2025-04-14
Payer: MEDICARE

## 2025-05-08 ENCOUNTER — TELEPHONE (OUTPATIENT)
Dept: PSYCHIATRY | Facility: CLINIC | Age: 30
End: 2025-05-08
Payer: MEDICARE

## 2025-05-23 ENCOUNTER — TELEPHONE (OUTPATIENT)
Dept: INTERNAL MEDICINE | Facility: CLINIC | Age: 30
End: 2025-05-23
Payer: MEDICARE

## 2025-05-23 NOTE — TELEPHONE ENCOUNTER
Copied from CRM #[id number]. Topic: General Inquiry - Patient Advice  >> May 23, 2025 10:58 AM Maite wrote:  Type:  Patient Requesting Call Back    Who Called:ROD  Does the patient know what this is regarding?:MOM HAS QUESTIONS ABOUT PATIENT'S MRI THAT WAS SCHEDULED FOR 5/26/25  Would the patient rather a call back or a response via [facility]? PLEASE CALL BACK  Best Call Back Number:[phone]  Additional Information:

## 2025-05-23 NOTE — TELEPHONE ENCOUNTER
Spoke with pt mother about MRI denial and advised her that we are aware and are working on an alternate test for the pt to have done. Mother wants to know would a CT be ok instead?

## 2025-05-29 ENCOUNTER — TELEPHONE (OUTPATIENT)
Dept: INTERNAL MEDICINE | Facility: CLINIC | Age: 30
End: 2025-05-29
Payer: MEDICARE

## 2025-05-29 NOTE — TELEPHONE ENCOUNTER
Tell his mother I'll likely have to do an appeal to his insurance. I'll add this to my task list for when I return to the office in 2 weeks.

## 2025-06-18 ENCOUNTER — TELEPHONE (OUTPATIENT)
Dept: INTERNAL MEDICINE | Facility: CLINIC | Age: 30
End: 2025-06-18
Payer: MEDICARE

## 2025-06-18 NOTE — TELEPHONE ENCOUNTER
----- Message from UBALDO Milan MD sent at 6/17/2025  7:45 PM CDT -----  Regarding: FW: Referral  Notify mother. Relay my regret about the situation. I recommend that she talk with their Medicaid  and ask their  to facilitate appointment with mental health counselor/therapist.  ----- Message -----  From: Monica Haro MA  Sent: 6/11/2025   9:55 AM CDT  To: UBALDO Milan MD  Subject: Referral                                         Good morning, psychiatry receive a referral for this patient. To be seen by a therapist. Unfortunately psychiatry will have to decline. Due to the diagnosis of autism. We apologize that we couldn't assist. Have a great day.

## 2025-06-30 ENCOUNTER — TELEPHONE (OUTPATIENT)
Dept: PULMONOLOGY | Facility: CLINIC | Age: 30
End: 2025-06-30
Payer: MEDICARE

## 2025-07-22 ENCOUNTER — OFFICE VISIT (OUTPATIENT)
Dept: PULMONOLOGY | Facility: CLINIC | Age: 30
End: 2025-07-22
Payer: MEDICARE

## 2025-07-22 VITALS
HEIGHT: 69 IN | RESPIRATION RATE: 24 BRPM | SYSTOLIC BLOOD PRESSURE: 110 MMHG | DIASTOLIC BLOOD PRESSURE: 72 MMHG | WEIGHT: 191.56 LBS | BODY MASS INDEX: 28.37 KG/M2 | OXYGEN SATURATION: 96 % | HEART RATE: 80 BPM

## 2025-07-22 DIAGNOSIS — G47.33 OBSTRUCTIVE SLEEP APNEA ON CPAP: Primary | Chronic | ICD-10-CM

## 2025-07-22 PROCEDURE — 3078F DIAST BP <80 MM HG: CPT | Mod: CPTII,S$GLB,, | Performed by: HOSPITALIST

## 2025-07-22 PROCEDURE — 99202 OFFICE O/P NEW SF 15 MIN: CPT | Mod: S$GLB,,, | Performed by: HOSPITALIST

## 2025-07-22 PROCEDURE — 99999 PR PBB SHADOW E&M-EST. PATIENT-LVL IV: CPT | Mod: PBBFAC,,, | Performed by: HOSPITALIST

## 2025-07-22 PROCEDURE — 3008F BODY MASS INDEX DOCD: CPT | Mod: CPTII,S$GLB,, | Performed by: HOSPITALIST

## 2025-07-22 PROCEDURE — 1159F MED LIST DOCD IN RCRD: CPT | Mod: CPTII,S$GLB,, | Performed by: HOSPITALIST

## 2025-07-22 PROCEDURE — 3044F HG A1C LEVEL LT 7.0%: CPT | Mod: CPTII,S$GLB,, | Performed by: HOSPITALIST

## 2025-07-22 PROCEDURE — 3074F SYST BP LT 130 MM HG: CPT | Mod: CPTII,S$GLB,, | Performed by: HOSPITALIST

## 2025-07-22 PROCEDURE — 1160F RVW MEDS BY RX/DR IN RCRD: CPT | Mod: CPTII,S$GLB,, | Performed by: HOSPITALIST

## 2025-07-22 NOTE — PROGRESS NOTES
"Subjective:      Patient ID: Rome Kelly is a 29 y.o. male.    Chief Complaint: Sleep Apnea    HPI 7/22/25:    29 year old male with history of obesity, austim, sleep apnea who is referred to Pulmonary clinic by Lauri Milan MD for evaluation of sleep apnea.      Rome is accompanied by his mother. She reports that Rome was previously diagnosed with sleep apnea but had difficulty obtaining a machine with insurance. He currently has his mom's friend's machine. Reports no issues with use, therapy report reviewed- compliant, no significant leak, residual AHI 1.7.    Pertinent Work Up:  - [place] 7/22/25- 4  - HSAT 4/2024- AHI 9, RDI 23, SpO2 annabel 84.5%    Review of Systems   Respiratory:  Negative for snoring and somnolence.      Objective:     Physical Exam   Constitutional: He is oriented to person, place, and time. He appears well-developed and well-nourished. He is not obese.   Cardiovascular: Normal rate and regular rhythm.   Pulmonary/Chest: Normal expansion, effort normal and breath sounds normal.   Neurological: He is alert and oriented to person, place, and time.   Skin: Skin is warm and dry.     Personal Diagnostic Review  As Above      4/3/2025     8:30 AM 3/20/2024     8:31 AM 6/7/2023     9:42 AM 5/16/2023     9:28 AM 12/7/2022     9:49 AM 7/19/2022     9:51 AM 6/22/2022     8:29 AM   Pulmonary Function Tests   SpO2 96 % 97 % 97 % 97 % 97 % 97 %    Height 5' 7" (1.702 m) 5' 7" (1.702 m) 5' 7" (1.702 m) 5' 7" (1.702 m) 5' 7" (1.702 m)  5' 7" (1.702 m)   Weight 88.3 kg (194 lb 10.7 oz) 84.5 kg (186 lb 4.6 oz) 83.2 kg (183 lb 6.8 oz) 83.1 kg (183 lb 3.2 oz) 83.3 kg (183 lb 10.3 oz) 83 kg (182 lb 15.7 oz) 82.6 kg (182 lb 1.6 oz)   BMI (Calculated) 30.5 29.2 28.7 28.7 28.8  28.5        Assessment:     No diagnosis found.     Encounter Medications[1]  No orders of the defined types were placed in this encounter.      Plan:     Problem List Items Addressed This Visit       Obstructive sleep " apnea on CPAP - Primary (Chronic)    - HSAT 4/2024- AHI 9, RDI 23, SpO2 annabel 84.5%  - reviewed HSAT 4/2024 with pt and mother  - new PAP ordered  - reviewed current machine report- compliant with 100% and 100%, [id number]           Relevant Orders    CPAP FOR HOME USE     Follow up in about 10 weeks for initial compliance review.         [1]   Outpatient Encounter Medications as of 7/22/2025   Medication Sig Dispense Refill    fluticasone propionate (FLONASE) 50 mcg/actuation nasal spray Instill 2 sprays (100 mcg total) by Each Nostril route once daily. 16 g 11    loratadine (CLARITIN) 10 mg tablet Take 1 tablet (10 mg total) by mouth daily as needed for Allergies. 90 tablet 3     No facility-administered encounter medications on file as of 7/22/2025.

## 2025-07-22 NOTE — ASSESSMENT & PLAN NOTE
- HSAT 4/2024- AHI 9, RDI 23, SpO2 annabel 84.5%  - reviewed HSAT 4/2024 with pt and mother  - new PAP ordered  - reviewed current machine report- compliant with 100% and 100%, [id number]